# Patient Record
Sex: MALE | Race: WHITE | NOT HISPANIC OR LATINO | Employment: FULL TIME | URBAN - METROPOLITAN AREA
[De-identification: names, ages, dates, MRNs, and addresses within clinical notes are randomized per-mention and may not be internally consistent; named-entity substitution may affect disease eponyms.]

---

## 2017-08-02 ENCOUNTER — OFFICE VISIT (OUTPATIENT)
Dept: URGENT CARE | Facility: URGENT CARE | Age: 47
End: 2017-08-02
Payer: COMMERCIAL

## 2017-08-02 VITALS
TEMPERATURE: 98.1 F | RESPIRATION RATE: 22 BRPM | SYSTOLIC BLOOD PRESSURE: 120 MMHG | DIASTOLIC BLOOD PRESSURE: 82 MMHG | HEART RATE: 76 BPM | OXYGEN SATURATION: 96 % | WEIGHT: 208 LBS

## 2017-08-02 DIAGNOSIS — S61.209A OPEN WOUND OF FINGER, INITIAL ENCOUNTER: Primary | ICD-10-CM

## 2017-08-02 PROCEDURE — 12001 RPR S/N/AX/GEN/TRNK 2.5CM/<: CPT | Performed by: FAMILY MEDICINE

## 2017-08-02 NOTE — MR AVS SNAPSHOT
After Visit Summary   8/2/2017    Vasile Stallworth    MRN: [id number]           Patient Information     Date Of Birth          1970        Visit Information        Provider Department      8/2/2017 7:30 PM Barrett Rod MD [facility]        Today's Diagnoses     Open wound of finger, initial encounter    -  1      Care Instructions      Extremity Laceration: Sutures, Staples, or Tape  A laceration is a cut through the skin. If it is deep, it may require stitches (sutures) or staples to close so it can heal. Minor cuts may be treated with surgical tape closures.   X-rays may be done if something may have entered the skin through the cut. You may also need a tetanus shot if you are not up to date on this vaccination.  Home care    Follow the health care provider s instructions on how to care for the cut.    Wash your hands with soap and warm water before and after caring for your wound. This is to help prevent infection.    Keep the wound clean and dry. If a bandage was applied and it becomes wet or dirty, replace it. Otherwise, leave it in place for the first 24 hours, then change it once a day or as directed.    If sutures or staples were used, clean the wound daily:    After removing the bandage, wash the area with soap and water. Use a wet cotton swab to loosen and remove any blood or crust that forms.    After cleaning, keep the wound clean and dry. Talk with your doctor before applying any antibiotic ointment to the wound. Reapply the bandage.    You may remove the bandage to shower as usual after the first 24 hours, but do not soak the area in water (no swimming) until the stitches or staples are removed.    If surgical tape closures were used, keep the area clean and dry. If it becomes wet, blot it dry with a towel.    The doctor may prescribe an antibiotic cream or ointment to prevent infection. Do not stop taking this medication until you have finished the prescribed course or  the doctor tells you to stop. The doctor may also prescribe medications for pain. Follow the doctor s instructions for taking these medications.    Avoid activities that may reopen your wound.  Follow-up care  Follow up with your health care provider. Most skin wounds heal within ten days. However, an infection may sometimes occur despite proper treatment. Therefore, check the wound daily for the signs of infection listed below. Stitches and staples should be removed within 7-14 days. If surgical tape closures were used, you may remove them after 10 days if they have not fallen off by then.   When to seek medical advice  Call your health care provider right away if any of these occur:    Wound bleeding not controlled by direct pressure    Signs of infection, including increasing pain in the wound, increasing wound redness or swelling, or pus or bad odor coming from the wound    Fever of 100.4 F (38 C) or higher or as directed by your healthcare provider    Stitches or staples come apart or fall out or surgical tape falls off before 7 days    Wound edges re-open    Wound changes colors    Numbness around the wound     Decreased movement around the injured area  Date Last Reviewed: 6/14/2015 2000-2017 [employer]. [address] [place] [address]. All rights reserved. This information is not intended as a substitute for professional medical care. Always follow your healthcare professional's instructions.                Follow-ups after your visit        Who to contact     If you have questions or need follow up information about today's clinic visit or your schedule please contact [facility] directly at [phone].  Normal or non-critical lab and imaging results will be communicated to you by MyChart, letter or phone within 4 business days after the clinic has received the results. If you do not hear from us within 7 days, please contact the clinic through MyChart or phone.  "If you have a critical or abnormal lab result, we will notify you by phone as soon as possible.  Submit refill requests through [employer] or call your pharmacy and they will forward the refill request to us. Please allow 3 business days for your refill to be completed.          Additional Information About Your Visit        [employer] Information     [employer] lets you send messages to your doctor, view your test results, renew your prescriptions, schedule appointments and more. To sign up, go to [email or web address] . Click on \"Log in\" on the left side of the screen, which will take you to the Welcome page. Then click on \"Sign up Now\" on the right side of the page.     You will be asked to enter the access code listed below, as well as some personal information. Please follow the directions to create your username and password.     Your access code is: [email or web address]  Expires: 10/31/2017  8:23 PM     Your access code will  in 90 days. If you need help or a new code, please call your [facility] clinic or [phone].        Care EveryWhere ID     This is your Care EveryWhere ID. This could be used by other organizations to access your [facility] medical records  [id number]        Your Vitals Were     Pulse Temperature Respirations Pulse Oximetry          76 98.1  F (36.7  C) (Oral) 22 96%         Blood Pressure from Last 3 Encounters:   17 120/82    Weight from Last 3 Encounters:   17 208 lb (94.3 kg)              We Performed the Following     Less than 2.5 cm in length        Primary Care Provider    None Specified       No primary provider on file.        Equal Access to Services     [facility]: Hadii thu araujo Sodeirdre, waaxda luqadaha, qaybta kaalmachemo link . So [facility] [phone].    ATENCIÓN: Si habla español, tiene a lewis disposición servicios gratuitos de asistencia lingüística. Llame al [phone].    We comply with applicable federal civil " rights laws and [place] laws. We do not discriminate on the basis of race, color, national origin, age, disability sex, sexual orientation or gender identity.            Thank you!     Thank you for choosing [facility]  for your care. Our goal is always to provide you with excellent care. Hearing back from our patients is one way we can continue to improve our services. Please take a few minutes to complete the written survey that you may receive in the mail after your visit with us. Thank you!             Your Updated Medication List - Protect others around you: Learn how to safely use, store and throw away your medicines at www.disposemymeds.org.      Notice  As of 8/2/2017  8:23 PM    You have not been prescribed any medications.

## 2017-08-03 NOTE — PATIENT INSTRUCTIONS
Extremity Laceration: Sutures, Staples, or Tape  A laceration is a cut through the skin. If it is deep, it may require stitches (sutures) or staples to close so it can heal. Minor cuts may be treated with surgical tape closures.   X-rays may be done if something may have entered the skin through the cut. You may also need a tetanus shot if you are not up to date on this vaccination.  Home care    Follow the health care provider s instructions on how to care for the cut.    Wash your hands with soap and warm water before and after caring for your wound. This is to help prevent infection.    Keep the wound clean and dry. If a bandage was applied and it becomes wet or dirty, replace it. Otherwise, leave it in place for the first 24 hours, then change it once a day or as directed.    If sutures or staples were used, clean the wound daily:    After removing the bandage, wash the area with soap and water. Use a wet cotton swab to loosen and remove any blood or crust that forms.    After cleaning, keep the wound clean and dry. Talk with your doctor before applying any antibiotic ointment to the wound. Reapply the bandage.    You may remove the bandage to shower as usual after the first 24 hours, but do not soak the area in water (no swimming) until the stitches or staples are removed.    If surgical tape closures were used, keep the area clean and dry. If it becomes wet, blot it dry with a towel.    The doctor may prescribe an antibiotic cream or ointment to prevent infection. Do not stop taking this medication until you have finished the prescribed course or the doctor tells you to stop. The doctor may also prescribe medications for pain. Follow the doctor s instructions for taking these medications.    Avoid activities that may reopen your wound.  Follow-up care  Follow up with your health care provider. Most skin wounds heal within ten days. However, an infection may sometimes occur despite proper treatment.  Therefore, check the wound daily for the signs of infection listed below. Stitches and staples should be removed within 7-14 days. If surgical tape closures were used, you may remove them after 10 days if they have not fallen off by then.   When to seek medical advice  Call your health care provider right away if any of these occur:    Wound bleeding not controlled by direct pressure    Signs of infection, including increasing pain in the wound, increasing wound redness or swelling, or pus or bad odor coming from the wound    Fever of 100.4 F (38 C) or higher or as directed by your healthcare provider    Stitches or staples come apart or fall out or surgical tape falls off before 7 days    Wound edges re-open    Wound changes colors    Numbness around the wound     Decreased movement around the injured area  Date Last Reviewed: 6/14/2015 2000-2017 [employer]. [address] [place] [address]. All rights reserved. This information is not intended as a substitute for professional medical care. Always follow your healthcare professional's instructions.

## 2017-08-03 NOTE — NURSING NOTE
Chief Complaint   Patient presents with     Urgent Care     Laceration     ring finger left hand cut with  knife. Has been bleeding for an hour.        Initial /82  Pulse 76  Temp 98.1  F (36.7  C) (Oral)  Resp 22  Wt 208 lb (94.3 kg)  SpO2 96% There is no height or weight on file to calculate BMI.  Medication Reconciliation: complete  Raman Sidhu, CMA

## 2017-08-03 NOTE — PROGRESS NOTES
SUBJECTIVE:     Chief Complaint   Patient presents with     Urgent Care     Laceration     ring finger left hand cut with  knife. Has been bleeding for an hour.      Vasile Stallworth is a 47 year old male who presents to the clinic with a laceration on the left 4th finger sustained 1 hours(s) ago.  This is a non-work related and accidental injury.    Mechanism of injury: knife.    Associated symptoms: Denies numbness, weakness, or loss of function  Last tetanus booster within 10 years: yes    EXAM:   The patient appears today in alert,no apparent distress distress  VITALS: /82  Pulse 76  Temp 98.1  F (36.7  C) (Oral)  Resp 22  Wt 208 lb (94.3 kg)  SpO2 96%    Size of laceration: 1.5 centimeters  Characteristics of the laceration: bleeding- mild, semi-circular and straight  Tendon function intact: not applicable  Sensation to light touch intact: yes  Pulses intact: yes  Picture included in patient's chart: no    Assessment:  Open wound of finger, initial encounter    PLAN:  PROCEDURE NOTE::  Wound was locally injected with 4 cc's of Lidocaine 1% plain  Good anesthesia was obtained  Wound cleaned with betadine/saline solution  Wound cleaned with Shur-Clens  Laceration was closed using 4 5-0 nylon interrupted sutures    After care instructions:  Keep wound clean and dry for the next 24-48 hours  Sutures out in 7 days  Signs of infection discussed today  Apply anti-bacterial ointment for 5 days  Discussed the probability of scarring  Finger splint given to wear    Barrett Rod MD  August 2, 2017 8:27 PM

## 2020-02-23 ENCOUNTER — HEALTH MAINTENANCE LETTER (OUTPATIENT)
Age: 50
End: 2020-02-23

## 2021-04-11 ENCOUNTER — HEALTH MAINTENANCE LETTER (OUTPATIENT)
Age: 51
End: 2021-04-11

## 2021-07-06 ENCOUNTER — OFFICE VISIT (OUTPATIENT)
Dept: PEDIATRICS | Facility: CLINIC | Age: 51
End: 2021-07-06
Payer: COMMERCIAL

## 2021-07-06 VITALS
WEIGHT: 224.7 LBS | OXYGEN SATURATION: 98 % | HEIGHT: 74 IN | DIASTOLIC BLOOD PRESSURE: 78 MMHG | SYSTOLIC BLOOD PRESSURE: 126 MMHG | HEART RATE: 69 BPM | BODY MASS INDEX: 28.84 KG/M2 | TEMPERATURE: 98.2 F

## 2021-07-06 DIAGNOSIS — Z00.00 ROUTINE GENERAL MEDICAL EXAMINATION AT A HEALTH CARE FACILITY: Primary | ICD-10-CM

## 2021-07-06 DIAGNOSIS — Z23 NEED FOR VACCINATION: ICD-10-CM

## 2021-07-06 DIAGNOSIS — Z12.11 SPECIAL SCREENING FOR MALIGNANT NEOPLASMS, COLON: ICD-10-CM

## 2021-07-06 PROCEDURE — 80061 LIPID PANEL: CPT | Performed by: INTERNAL MEDICINE

## 2021-07-06 PROCEDURE — 90471 IMMUNIZATION ADMIN: CPT | Performed by: INTERNAL MEDICINE

## 2021-07-06 PROCEDURE — 36415 COLL VENOUS BLD VENIPUNCTURE: CPT | Performed by: INTERNAL MEDICINE

## 2021-07-06 PROCEDURE — 80053 COMPREHEN METABOLIC PANEL: CPT | Performed by: INTERNAL MEDICINE

## 2021-07-06 PROCEDURE — 99386 PREV VISIT NEW AGE 40-64: CPT | Performed by: INTERNAL MEDICINE

## 2021-07-06 PROCEDURE — 90750 HZV VACC RECOMBINANT IM: CPT | Performed by: INTERNAL MEDICINE

## 2021-07-06 ASSESSMENT — ENCOUNTER SYMPTOMS
ALLERGIC/IMMUNOLOGIC NEGATIVE: 1
NEUROLOGICAL NEGATIVE: 1
ENDOCRINE NEGATIVE: 1
CARDIOVASCULAR NEGATIVE: 1
MUSCULOSKELETAL NEGATIVE: 1
PSYCHIATRIC NEGATIVE: 1
EYES NEGATIVE: 1
RESPIRATORY NEGATIVE: 1
HEMATOLOGIC/LYMPHATIC NEGATIVE: 1
CONSTITUTIONAL NEGATIVE: 1
GASTROINTESTINAL NEGATIVE: 1

## 2021-07-06 ASSESSMENT — MIFFLIN-ST. JEOR: SCORE: 1943.98

## 2021-07-06 NOTE — PATIENT INSTRUCTIONS
"Lab work today:  We can do labs in the exam room today, or you can get them done downstairs in the lab.      If you are going downstairs:  Directions:  As you walk through the first floor, you'll see (on the right) first the pharmacy, then some bathrooms, then the \"Lab and Imaging\" area. Give them your name at the window there and wait for them to call you.     Get your shingles vaccine (\"Shingrix\") today.    You will need a second Shingrix anywhere from 2-6 months later.    Cardio exercise is probably the most helpful thing for your heart and future health.  Try to get about 30 minutes of sustained cardio exercise (biking, running, swimming, fast walking) every other day or even every day.  Keeping your heart rate at a \"target\" of (220 - your age) x 0.80 will be your goal.  For example, if you're 60 years old, this would be (220 - 60) x 0.80 = 128 beats per minute for those 30 minutes of exercise.    Colonoscopy: you can call [phone] to set up your colonoscopy.  If they have not heard from you, they may call you directly.          Preventive Health Recommendations  Male Ages 50 - 64    Yearly exam:             See your health care provider every year in order to  o   Review health changes.   o   Discuss preventive care.    o   Review your medicines if your doctor has prescribed any.     Have a cholesterol test every 5 years, or more frequently if you are at risk for high cholesterol/heart disease.     Have a diabetes test (fasting glucose) every three years. If you are at risk for diabetes, you should have this test more often.     Have a colonoscopy at age 50, or have a yearly FIT test (stool test). These exams will check for colon cancer.      Talk with your health care provider about whether or not a prostate cancer screening test (PSA) is right for you.    You should be tested each year for STDs (sexually transmitted diseases), if you re at risk.     Shots: Get a flu shot each year. Get a tetanus shot " every 10 years.     Nutrition:    Eat at least 5 servings of fruits and vegetables daily.     Eat whole-grain bread, whole-wheat pasta and brown rice instead of white grains and rice.     Get adequate Calcium and Vitamin D.     Lifestyle    Exercise for at least 150 minutes a week (30 minutes a day, 5 days a week). This will help you control your weight and prevent disease.     Limit alcohol to one drink per day.     No smoking.     Wear sunscreen to prevent skin cancer.     See your dentist every six months for an exam and cleaning.     See your eye doctor every 1 to 2 years.

## 2021-07-06 NOTE — PROGRESS NOTES
New Patient/Transfer of Care    Previous PCP or place of care: Dr. Marcia Wilder   Up to date on yearly wellness exam? NO  Reason for transfer of care (if known): Insurance Change     Health Maintenance:    Colonoscopy  Done? NO  Lipid  Done? N    Tdap  Done in last 10 years? Y, on this date: 4/21/2015  Flu   Done this year? N      SUBJECTIVE:   CC: Vasile Stallworth is an 51 year old male who presents for preventative health visit.     Patient has been advised of split billing requirements and indicates understanding: Yes     Healthy Habits:     Getting at least 3 servings of Calcium per day:  Yes    Bi-annual eye exam:  NO    Dental care twice a year:  NO    Sleep apnea or symptoms of sleep apnea:  None    Diet:  Regular (no restrictions)    Frequency of exercise:  2-3 days/week    Duration of exercise:  30-45 minutes    Taking medications regularly:  0    Barriers to taking medications:  None    Medication side effects:  None    PHQ-2 Total Score: 0    Additional concerns today:  No  History of Present Illness       He eats 4 or more servings of fruits and vegetables daily.He consumes 0 sweetened beverage(s) daily.He exercises with enough effort to increase his heart rate 30 to 60 minutes per day.  He exercises with enough effort to increase his heart rate 3 or less days per week.   He is taking medications regularly.  He is not taking prescribed medications regularly due to None.    Less exercise than he'd like; twice per week.      Works in IT: sedentary        Today's PHQ-2 Score:   PHQ-2 ( 1999 [employer]) 7/6/2021   Q1: Little interest or pleasure in doing things 0   Q2: Feeling down, depressed or hopeless 0   PHQ-2 Score 0   Q1: Little interest or pleasure in doing things Not at all   Q2: Feeling down, depressed or hopeless Not at all   PHQ-2 Score 0     Abuse: Current or Past(Physical, Sexual or Emotional)- No  Do you feel safe in your environment? Yes    Have you ever done Advance Care Planning? (For  example, a Health Directive, POLST, or a discussion with a medical provider or your loved ones about your wishes): No, advance care planning information given to patient to review.  Patient plans to discuss their wishes with loved ones or provider.      Social History     Tobacco Use     Smoking status: Never Smoker     Smokeless tobacco: Never Used   Substance Use Topics     Alcohol use: Yes     If you drink alcohol do you typically have >3 drinks per day or >7 drinks per week? No    Last PSA: No results found for: PSA    Reviewed orders with patient. Reviewed health maintenance and updated orders accordingly - Yes  Lab work is in process  Labs reviewed in EPIC  BP Readings from Last 3 Encounters:   07/06/21 126/78   08/02/17 120/82    Wt Readings from Last 3 Encounters:   07/06/21 101.9 kg (224 lb 11.2 oz)   08/02/17 94.3 kg (208 lb)                  Patient Active Problem List   Diagnosis   (none) - all problems resolved or deleted     Past Surgical History:   Procedure Laterality Date     ROTATOR CUFF REPAIR RT/LT Right     Nov 2017     XR ANKLE SURGERY SHELBY RIGHT -       2007       Social History     Tobacco Use     Smoking status: Never Smoker     Smokeless tobacco: Never Used   Substance Use Topics     Alcohol use: Yes     Family History   Problem Relation Age of Onset     Parkinsonism Father          No current outpatient medications on file.     No Known Allergies    Reviewed and updated as needed this visit by clinical staff                 Reviewed and updated as needed this visit by Provider                  History reviewed. No pertinent past medical history.   Past Surgical History:   Procedure Laterality Date     ROTATOR CUFF REPAIR RT/LT Right     Nov 2017     XR ANKLE SURGERY SHELBY RIGHT - PH      2007       Review of Systems   Constitutional: Negative.    HENT: Negative.    Eyes: Negative.    Respiratory: Negative.    Cardiovascular: Negative.    Gastrointestinal: Negative.    Endocrine: Negative.  "   Genitourinary: Negative.    Musculoskeletal: Negative.    Allergic/Immunologic: Negative.    Neurological: Negative.    Hematological: Negative.    Psychiatric/Behavioral: Negative.      CONSTITUTIONAL: NEGATIVE for fever, chills, change in weight  INTEGUMENTARY/SKIN: NEGATIVE for worrisome rashes, moles or lesions  EYES: NEGATIVE for vision changes or irritation  ENT: NEGATIVE for ear, mouth and throat problems  RESP: NEGATIVE for significant cough or SOB  CV: NEGATIVE for chest pain, palpitations or peripheral edema  GI: NEGATIVE for nausea, abdominal pain, heartburn, or change in bowel habits   male: negative for dysuria, hematuria, decreased urinary stream, erectile dysfunction, urethral discharge  MUSCULOSKELETAL: NEGATIVE for significant arthralgias or myalgia  NEURO: NEGATIVE for weakness, dizziness or paresthesias  PSYCHIATRIC: NEGATIVE for changes in mood or affect    OBJECTIVE:   /78   Pulse 69   Temp 98.2  F (36.8  C) (Tympanic)   Ht 1.88 m (6' 2\")   Wt 101.9 kg (224 lb 11.2 oz)   SpO2 98%   BMI 28.85 kg/m      Physical Exam  GENERAL: healthy, alert and no distress  EYES: Eyes grossly normal to inspection, PERRL and conjunctivae and sclerae normal  HENT: ear canals and TM's normal, nose and mouth without ulcers or lesions  NECK: no adenopathy, no asymmetry, masses, or scars and thyroid normal to palpation  RESP: lungs clear to auscultation - no rales, rhonchi or wheezes  CV: regular rate and rhythm, normal S1 S2, no S3 or S4, no murmur, click or rub, no peripheral edema and peripheral pulses strong  ABDOMEN: soft, nontender, no hepatosplenomegaly, no masses and bowel sounds normal   (male): normal male genitalia without lesions or urethral discharge, no hernia  MS: no gross musculoskeletal defects noted, no edema  SKIN: no suspicious lesions or rashes  NEURO: Normal strength and tone, mentation intact and speech normal  PSYCH: mentation appears normal, affect normal/bright    Diagnostic " Test Results:  Labs reviewed in Epic    ASSESSMENT/PLAN:   1. Routine general medical examination at a health care facility  Discussed diet, exercise, testicular self exam, blood pressure, cholesterol, and need for cancer surveillance at appropriate ages.   - Lipid panel reflex to direct LDL Fasting  - Comprehensive metabolic panel (BMP + Alb, Alk Phos, ALT, AST, Total. Bili, TP)    2. Special screening for malignant neoplasms, colon  Due.  Ordered.   - GASTROENTEROLOGY ADULT REF PROCEDURE ONLY; Future    Patient has been advised of split billing requirements and indicates understanding: Yes  COUNSELING:   Reviewed preventive health counseling, as reflected in patient instructions       Regular exercise       Healthy diet/nutrition       Vision screening       Hearing screening       Colon cancer screening       Prostate cancer screening    There is no height or weight on file to calculate BMI.         He reports that he has never smoked. He has never used smokeless tobacco.      Counseling Resources:  ATP IV Guidelines  Pooled Cohorts Equation Calculator  FRAX Risk Assessment  ICSI Preventive Guidelines  Dietary Guidelines for Americans, 2010  USDA's MyPlate  ASA Prophylaxis  Lung CA Screening    Mukesh Palmer MD  [facility]

## 2021-07-07 LAB
ALBUMIN SERPL-MCNC: 4.1 G/DL (ref 3.4–5)
ALP SERPL-CCNC: 55 U/L (ref 40–150)
ALT SERPL W P-5'-P-CCNC: 28 U/L (ref 0–70)
ANION GAP SERPL CALCULATED.3IONS-SCNC: 8 MMOL/L (ref 3–14)
AST SERPL W P-5'-P-CCNC: 21 U/L (ref 0–45)
BILIRUB SERPL-MCNC: 0.5 MG/DL (ref 0.2–1.3)
BUN SERPL-MCNC: 15 MG/DL (ref 7–30)
CALCIUM SERPL-MCNC: 9.2 MG/DL (ref 8.5–10.1)
CHLORIDE SERPL-SCNC: 104 MMOL/L (ref 94–109)
CHOLEST SERPL-MCNC: 185 MG/DL
CO2 SERPL-SCNC: 25 MMOL/L (ref 20–32)
CREAT SERPL-MCNC: 1.13 MG/DL (ref 0.66–1.25)
GFR SERPL CREATININE-BSD FRML MDRD: 75 ML/MIN/{1.73_M2}
GLUCOSE SERPL-MCNC: 87 MG/DL (ref 70–99)
HDLC SERPL-MCNC: 53 MG/DL
LDLC SERPL CALC-MCNC: 105 MG/DL
NONHDLC SERPL-MCNC: 132 MG/DL
POTASSIUM SERPL-SCNC: 4.2 MMOL/L (ref 3.4–5.3)
PROT SERPL-MCNC: 6.9 G/DL (ref 6.8–8.8)
SODIUM SERPL-SCNC: 137 MMOL/L (ref 133–144)
TRIGL SERPL-MCNC: 134 MG/DL

## 2021-09-25 ENCOUNTER — HEALTH MAINTENANCE LETTER (OUTPATIENT)
Age: 51
End: 2021-09-25

## 2022-07-15 RX ORDER — MULTIVITAMIN,THERAPEUTIC
1 TABLET ORAL DAILY
COMMUNITY

## 2022-07-18 ENCOUNTER — HOSPITAL ENCOUNTER (OUTPATIENT)
Facility: CLINIC | Age: 52
Discharge: HOME | End: 2022-07-18
Attending: INTERNAL MEDICINE | Admitting: INTERNAL MEDICINE
Payer: COMMERCIAL

## 2022-07-18 VITALS
HEIGHT: 74 IN | HEART RATE: 66 BPM | DIASTOLIC BLOOD PRESSURE: 81 MMHG | OXYGEN SATURATION: 100 % | SYSTOLIC BLOOD PRESSURE: 116 MMHG | TEMPERATURE: 97.7 F | BODY MASS INDEX: 28.23 KG/M2 | RESPIRATION RATE: 14 BRPM | WEIGHT: 220 LBS

## 2022-07-18 LAB — COLONOSCOPY: NORMAL

## 2022-07-18 PROCEDURE — G0500 MOD SEDAT ENDO SERVICE >5YRS: HCPCS | Performed by: INTERNAL MEDICINE

## 2022-07-18 PROCEDURE — 250N000011 HC RX IP 250 OP 636: Performed by: INTERNAL MEDICINE

## 2022-07-18 PROCEDURE — 45378 DIAGNOSTIC COLONOSCOPY: CPT | Performed by: INTERNAL MEDICINE

## 2022-07-18 PROCEDURE — G0121 COLON CA SCRN NOT HI RSK IND: HCPCS | Performed by: INTERNAL MEDICINE

## 2022-07-18 RX ORDER — EPINEPHRINE 1 MG/ML
0.1 INJECTION, SOLUTION INTRAMUSCULAR; SUBCUTANEOUS
Status: DISCONTINUED | OUTPATIENT
Start: 2022-07-18 | End: 2022-07-18 | Stop reason: HOSPADM

## 2022-07-18 RX ORDER — NALOXONE HYDROCHLORIDE 0.4 MG/ML
0.4 INJECTION, SOLUTION INTRAMUSCULAR; INTRAVENOUS; SUBCUTANEOUS
Status: DISCONTINUED | OUTPATIENT
Start: 2022-07-18 | End: 2022-07-18 | Stop reason: HOSPADM

## 2022-07-18 RX ORDER — PROCHLORPERAZINE MALEATE 10 MG
10 TABLET ORAL EVERY 6 HOURS PRN
Status: DISCONTINUED | OUTPATIENT
Start: 2022-07-18 | End: 2022-07-18 | Stop reason: HOSPADM

## 2022-07-18 RX ORDER — ATROPINE SULFATE 0.1 MG/ML
1 INJECTION INTRAVENOUS
Status: DISCONTINUED | OUTPATIENT
Start: 2022-07-18 | End: 2022-07-18 | Stop reason: HOSPADM

## 2022-07-18 RX ORDER — FENTANYL CITRATE 0.05 MG/ML
50-100 INJECTION, SOLUTION INTRAMUSCULAR; INTRAVENOUS EVERY 5 MIN PRN
Status: DISCONTINUED | OUTPATIENT
Start: 2022-07-18 | End: 2022-07-18 | Stop reason: HOSPADM

## 2022-07-18 RX ORDER — ONDANSETRON 2 MG/ML
4 INJECTION INTRAMUSCULAR; INTRAVENOUS EVERY 6 HOURS PRN
Status: DISCONTINUED | OUTPATIENT
Start: 2022-07-18 | End: 2022-07-18 | Stop reason: HOSPADM

## 2022-07-18 RX ORDER — FLUMAZENIL 0.1 MG/ML
0.2 INJECTION, SOLUTION INTRAVENOUS
Status: DISCONTINUED | OUTPATIENT
Start: 2022-07-18 | End: 2022-07-18 | Stop reason: HOSPADM

## 2022-07-18 RX ORDER — NALOXONE HYDROCHLORIDE 0.4 MG/ML
0.2 INJECTION, SOLUTION INTRAMUSCULAR; INTRAVENOUS; SUBCUTANEOUS
Status: DISCONTINUED | OUTPATIENT
Start: 2022-07-18 | End: 2022-07-18 | Stop reason: HOSPADM

## 2022-07-18 RX ORDER — ONDANSETRON 2 MG/ML
4 INJECTION INTRAMUSCULAR; INTRAVENOUS
Status: DISCONTINUED | OUTPATIENT
Start: 2022-07-18 | End: 2022-07-18 | Stop reason: HOSPADM

## 2022-07-18 RX ORDER — SIMETHICONE 40MG/0.6ML
133 SUSPENSION, DROPS(FINAL DOSAGE FORM)(ML) ORAL
Status: DISCONTINUED | OUTPATIENT
Start: 2022-07-18 | End: 2022-07-18 | Stop reason: HOSPADM

## 2022-07-18 RX ORDER — DIPHENHYDRAMINE HYDROCHLORIDE 50 MG/ML
25-50 INJECTION INTRAMUSCULAR; INTRAVENOUS
Status: DISCONTINUED | OUTPATIENT
Start: 2022-07-18 | End: 2022-07-18 | Stop reason: HOSPADM

## 2022-07-18 RX ORDER — ONDANSETRON 4 MG/1
4 TABLET, ORALLY DISINTEGRATING ORAL EVERY 6 HOURS PRN
Status: DISCONTINUED | OUTPATIENT
Start: 2022-07-18 | End: 2022-07-18 | Stop reason: HOSPADM

## 2022-07-18 RX ORDER — LIDOCAINE 40 MG/G
CREAM TOPICAL
Status: DISCONTINUED | OUTPATIENT
Start: 2022-07-18 | End: 2022-07-18 | Stop reason: HOSPADM

## 2022-07-18 RX ADMIN — MIDAZOLAM 2 MG: 1 INJECTION INTRAMUSCULAR; INTRAVENOUS at 13:44

## 2022-07-18 RX ADMIN — FENTANYL CITRATE 100 MCG: 50 INJECTION INTRAMUSCULAR; INTRAVENOUS at 13:44

## 2022-07-18 NOTE — H&P
"Pre-Endoscopy History and Physical     Vasile Stallworth MRN# [id number]   YOB: 1970 Age: 52 year old     Date of Procedure: 7/18/2022  Primary care provider: Mukesh Palmer  Type of Endoscopy: Colonoscopy with possible biopsy, possible polypectomy  Reason for Procedure: screen  Type of Anesthesia Anticipated: Conscious Sedation    HPI:    Vasile is a 52 year old male who will be undergoing the above procedure.      A history and physical has been performed. The patient's medications and allergies have been reviewed. The risks and benefits of the procedure and the sedation options and risks were discussed with the patient.  All questions were answered and informed consent was obtained.      He denies a personal or family history of anesthesia complications or bleeding disorders.     Patient Active Problem List   Diagnosis   (none) - all problems resolved or deleted        No past medical history on file.     Past Surgical History:   Procedure Laterality Date     ROTATOR CUFF REPAIR RT/LT Right     Nov 2017     XR ANKLE SURGERY SHELBY RIGHT - PH      2007       Social History     Tobacco Use     Smoking status: Never Smoker     Smokeless tobacco: Never Used   Substance Use Topics     Alcohol use: Yes     Comment: 10-14 drinks a week       Family History   Problem Relation Age of Onset     Parkinsonism Father      Colon Cancer No family hx of        Prior to Admission medications    Medication Sig Start Date End Date Taking? Authorizing Provider   multivitamin, therapeutic (THERA-VIT) TABS tablet Take 1 tablet by mouth daily   Yes Reported, Patient       No Known Allergies     REVIEW OF SYSTEMS:   5 point ROS negative except as noted above in HPI, including Gen., Resp., CV, GI &  system review.    PHYSICAL EXAM:   There were no vitals taken for this visit. Estimated body mass index is 28.85 kg/m  as calculated from the following:    Height as of 7/6/21: 1.88 m (6' 2\").    Weight as of 7/6/21: 101.9 kg (224 " lb 11.2 oz).   GENERAL APPEARANCE: alert, and oriented  MENTAL STATUS: alert  AIRWAY EXAM: Mallampatti Class I (visualization of the soft palate, fauces, uvula, anterior and posterior pillars)  RESP: lungs clear to auscultation - no rales, rhonchi or wheezes  CV: regular rates and rhythm  DIAGNOSTICS:    Not indicated    IMPRESSION   ASA Class 2 - Mild systemic disease    PLAN:   Plan for Colonoscopy with possible biopsy, possible polypectomy. We discussed the risks, benefits and alternatives and the patient wished to proceed.    The above has been forwarded to the consulting provider.      Signed Electronically by: Deonder Trinh MD  July 18, 2022

## 2022-07-18 NOTE — LETTER
July 1, 2022      Vasile Stallworth  [address]  [place] [address]        Dear Vasile,     Please be aware that coverage of these services is subject to the terms and limitations of your health insurance plan.  Call member services at your health plan with any benefit or coverage questions.    Thank you for choosing [facility]. You are scheduled for the following service(s):    Date:  7/18/2022 Monday             Procedure:  COLONOSCOPY  Doctor:        Dr. Trinh   Arrival Time:  12:45 PM *Enter and check in at the Main Hospital Entrance  Procedure Time:  1:30 PM      Location:   [facility]        Endoscopy Department, First Floor         [address] [place], [place] [address]      [phone] or [phone] ([facility]) to reschedule        MIRALAX -GATORADE  PREP  Colonoscopy is the most accurate test to detect colon polyps and colon cancer; and the only test where polyps can be removed. During this procedure, a doctor examines the lining of your large intestine and rectum through a flexible tube.   Transportation  You must arrange for a ride for the day of your procedure with a responsible adult. A taxi , Uber, etc, is not an option unless you are accompanied by a responsible adult. If you fail to arrange transportation with a responsible adult, your procedure will be cancelled and rescheduled.    Purchase the  following supplies at your local pharmacy:  - 2 (two) bisacodyl tablets: each tablet contains 5 mg.  (Dulcolax  laxative NOT Dulcolax  stool softener)   - 1 (one) 8.3 oz bottle of Polyethylene Glycol (PEG) 3350 Powder   (MiraLAX , Smooth LAX , ClearLAX  or equivalent)  - 64 oz Gatorade    Regular Gatorade, Gatorade G2 , Powerade , Powerade Zero  or Pedialyte  is acceptable. Red colored flavors are not allowed; all other colors (yellow, green, orange, purple and blue) are okay. It is also okay to buy two 2.12 oz packets of powdered Gatorade that  can be mixed with water to a total volume of 64 oz of liquid.  - 1 (one) 10 oz bottle of Magnesium Citrate (Red colored flavors are not allowed)  It is also okay for you to use a 0.5 oz package of powdered magnesium citrate (17 g) mixed with 10 oz of water.      PREPARATION FOR COLONOSCOPY    7 days before:    Discontinue fiber supplements and medications containing iron. This includes Metamucil  and Fibercon ; and multivitamins with iron.    3 days before:    Begin a low-fiber diet. A low-fiber diet helps making the cleanout more effective.     Examples of a low-fiber diet include (but are not limited to): white bread, white rice, pasta, crackers, fish, chicken, eggs, ground beef, creamy peanut butter, cooked/steamed/boiled vegetables, canned fruit, bananas, melons, milk, plain yogurt cheese, salad dressing and other condiments.     The following are not allowed on a low-fiber diet: seeds, nuts, popcorn, bran, whole wheat, corn, quinoa, raw fruits and vegetables, berries and dried fruit, beans and lentils.    For additional details on low-fiber diet, please refer to the table on the last page.    2 days before:    Continue the low-fiber diet.     Drink at least 8 glasses of water throughout the day.     Stop eating solid foods at 11:45 pm.    1 day before:    In the morning: begin a clear liquid diet (liquids you can see through).     Examples of a clear liquid diet include: water, clear broth or bouillon, Gatorade, Pedialyte or Powerade, carbonated and non-carbonated soft drinks (Sprite , 7-Up , ginger ale), strained fruit juices without pulp (apple, white grape, white cranberry), Jell-O  and popsicles.     The following are not allowed on a clear liquid diet: red liquids, alcoholic beverages, dairy products (milk, creamer, and yogurt), protein shakes, creamy broths, juice with pulp and chewing tobacco.    At noon: take 2 (two) bisacodyl tablets     At 4 (and no later than 6pm): start drinking the Miralax-Gatorade  preparation (8.3 oz of Miralax mixed with 64 oz of Gatorade in a large pitcher). Drink 1(one) 8 oz glass every 15 minutes thereafter, until the mixture is gone.    COLON CLEANSING TIPS: drink adequate amounts of fluids before and after your colon cleansing to prevent dehydration. Stay near a toilet because you will have diarrhea. Even if you are sitting on the toilet, continue to drink the cleansing solution every 15 minutes. If you feel nauseous or vomit, rinse your mouth with water, take a 15 to 30-minute-break and then continue drinking the solution. You will be uncomfortable until the stool has flushed from your colon (in about 2 to 4 hours). You may feel chilled.    Day of your procedure  You may take your morning medications including blood pressure medications, methadone, anti-seizure medications with sips of water 3 hours prior to your procedure or earlier. Do not take insulin, blood thinners (unless specifically told by your primary care provider) or vitamins prior to your procedure. Continue the clear liquid diet.   4 hours prior: drink 10 oz of magnesium citrate. It may be easier to drink it with a straw.    STOP consuming all liquids after that.     Do not take anything by mouth during this time.     Allow extra time to travel to your procedure as you may need to stop and use a restroom along the way.    You are ready for the procedure, if you followed all instructions and your stool is no longer formed, but clear or yellow liquid. If you are unsure whether your colon is clean, please call our office at [phone] before you leave for your appointment.    Bring the following to your procedure:  - Insurance Card/Photo ID.   - List of current medications including over-the-counter medications and supplements.   - Your rescue inhaler if you currently use one to control asthma.    Canceling or rescheduling your appointment:   If you must cancel or reschedule your appointment, please call [phone] as  soon as possible.      COLONOSCOPY PRE-PROCEDURE CHECKLIST    If you have diabetes, ask your regular doctor for diet and medication restrictions.  If you take an anticoagulant or anti-platelet medication (such as Coumadin , Lovenox , Pradaxa , Xarelto , Eliquis , etc.), please call your primary doctor for advice on holding this medication.  If you take aspirin you may continue to do so.  If you are or may be pregnant, please discuss the risks and benefits of this procedure with your doctor.        What happens during a colonoscopy?    Plan to spend up to two hours, starting at registration time, at the endoscopy center the day of your procedure. The colonoscopy takes an average of 15 to 30 minutes. Recovery time is about 30 minutes.      Before the exam:    You will change into a gown.    Your medical history and medication list will be reviewed with you, unless that has been done over the phone prior to the procedure.     A nurse will insert an intravenous (IV) line into your hand or arm.    The doctor will meet with you and will give you a consent form to sign.  During the exam:     Medicine will be given through the IV line to help you relax.     Your heart rate and oxygen levels will be monitored. If your blood pressure is low, you may be given fluids through the IV line.     The doctor will insert a flexible hollow tube, called a colonoscope, into your rectum. The scope will be advanced slowly through the large intestine (colon).    You may have a feeling of fullness or pressure.     If an abnormal tissue or a polyp is found, the doctor may remove it through the endoscope for closer examination, or biopsy. Tissue removal is painless    After the exam:           Any tissue samples removed during the exam will be sent to a lab for evaluation. It may take 5-7 working days for you to be notified of the results.     A nurse will provide you with complete discharge instructions before you leave the endoscopy center.  Be sure to ask the nurse for specific instructions if you take blood thinners such as Aspirin, Coumadin or Plavix.     The doctor will prepare a full report for you and for the physician who referred you for the procedure.     Your doctor will talk with you about the initial results of your exam.      Medication given during the exam will prohibit you from driving for the rest of the day.     Following the exam, you may resume your normal diet. Your first meal should be light, no greasy foods. Avoid alcohol until the next day.     You may resume your regular activities the day after the procedure.         LOW-FIBER DIET    Foods RECOMMENDED Foods to AVOID   Breads, Cereal, Rice and Pasta:   White bread, rolls, biscuits, croissant and nani toast.   Waffles, Hungarian toast and pancakes.   White rice, noodles, pasta, macaroni and peeled cooked potatoes.   Plain crackers and saltines.   Cooked cereals: farina, cream of rice.   Cold cereals: Puffed Rice , Rice Krispies , Corn Flakes  and Special K    Breads, Cereal, Rice and Pasta:   Breads or rolls with nuts, seeds or fruit.   Whole wheat, pumpernickel, rye breads and cornbread.   Potatoes with skin, brown or wild rice, and kasha (buckwheat).     Vegetables:   Tender cooked and canned vegetables without seeds: carrots, asparagus tips, green or wax beans, pumpkin, spinach, lima beans. Vegetables:   Raw or steamed vegetables.   Vegetables with seeds.   Sauerkraut.   Winter squash, peas, broccoli, Brussel sprouts, cabbage, onions, cauliflower, baked beans, peas and corn.   Fruits:   Strained fruit juice.   Canned fruit, except pineapple.   Ripe bananas and melon. Fruits:   Prunes and prune juice.   Raw fruits.   Dried fruits: figs, dates and raisins.   Milk/Dairy:   Milk: plain or flavored.   Yogurt, custard and ice cream.   Cheese and cottage cheese Milk/Dairy:     Meat and other proteins:   ground, well-cooked tender beef, lamb, ham, veal, pork, fish, poultry and organ  meats.   Eggs.   Peanut butter without nuts. Meat and other proteins:   Tough, fibrous meats with gristle.   Dry beans, peas and lentils.   Peanut butter with nuts.   Tofu.   Fats, Snack, Sweets, Condiments and Beverages:   Margarine, butter, oils, mayonnaise, sour cream and salad dressing, plain gravy.   Sugar, hard candy, clear jelly, honey and syrup.   Spices, cooked herbs, bouillon, broth and soups made with allowed vegetable, ketchup and mustard.   Coffee, tea and carbonated drinks.   Plain cakes, cookies and pretzels.   Gelatin, plain puddings, custard, ice cream, sherbet and popsicles. Fats, Snack, Sweets, Condiments and Beverages:   Nuts, seeds and coconut.   Jam, marmalade and preserves.   Pickles, olives, relish and horseradish.   All desserts containing nuts, seeds, dried fruit and coconut; or made from whole grains or bran.   Candy made with nuts or seeds.   Popcorn.         DIRECTIONS TO THE ENDOSCOPY DEPARTMENT    From the north ([facility])  Take [address] South, exit on [address] [address]. Get into the left hand anthony, turn left (east), go one-half mile to [address] and turn left. Go north to the second stoplight, take a right on [address] and follow it to the Main Hospital entrance.    From the south ([facility])  Take 35N to the [address]E split and exit on [address] [address]. On [address] [address], turn left (west) to [address]. Turn right (north) on [address]. Go north to the second stoplight, take a right on [address] and follow it to the Main Hospital entrance.    From the east via 35E ([facility])  Take [address] south to [address] [address] exit. Turn right on [address] [address]. Go west to [address]. Turn right (north) on [address]. Go to the second stoplight, take a right on [address] to the Main Hospital entrance.    From the east via [address] ([facility])  Take [address] to [address]. Turn left (south) on [address] to [address], turn left (east) on [address] and follow it to the Main Hospital entrance.    From the west via [address] ([place], [place])  Take [address] east to [address]. Turn right (south) on [address] to [address], turn left (east) on [address] and follow it to the Main Hospital entrance.

## 2022-08-02 ENCOUNTER — OFFICE VISIT (OUTPATIENT)
Dept: INTERNAL MEDICINE | Facility: CLINIC | Age: 52
End: 2022-08-02
Payer: COMMERCIAL

## 2022-08-02 VITALS
HEART RATE: 94 BPM | WEIGHT: 216.2 LBS | OXYGEN SATURATION: 98 % | RESPIRATION RATE: 16 BRPM | SYSTOLIC BLOOD PRESSURE: 115 MMHG | BODY MASS INDEX: 27.75 KG/M2 | DIASTOLIC BLOOD PRESSURE: 71 MMHG | TEMPERATURE: 98.4 F | HEIGHT: 74 IN

## 2022-08-02 DIAGNOSIS — M25.511 ACUTE PAIN OF RIGHT SHOULDER: Primary | ICD-10-CM

## 2022-08-02 PROCEDURE — 99203 OFFICE O/P NEW LOW 30 MIN: CPT | Performed by: INTERNAL MEDICINE

## 2022-08-02 NOTE — NURSING NOTE
"/71 (BP Location: Right arm, Patient Position: Sitting, Cuff Size: Adult Large)   Pulse 94   Temp 98.4  F (36.9  C) (Oral)   Resp 16   Ht 1.88 m (6' 2\")   Wt 98.1 kg (216 lb 3.2 oz)   SpO2 98%   BMI 27.76 kg/m      "

## 2022-08-02 NOTE — PROGRESS NOTES
Assessment & Plan     Acute pain of right shoulder  Advised this is most likely some impingement, may have some biceps tendinitis.  Does not seem to be likely to be rotator cuff tear.  Continue using ice, can increase ibuprofen to 3 to 4 tablets 3 times a day for 5 to 7 days.  Given a handout with some range of motion exercises.  If it is not improving or worsening, refer to orthopedics, he would not need to return but can just send a message through [employer].        Return in about 2 months (around 10/2/2022) for Physical Exam with Dr. Palmer.    Julia Bergeron MD  [facility] ANNE Garcia is a 52 year old, presenting for the following health issues:    He presents with complaints of right shoulder pain: This is anterior, a little bit posterior.  It started 1-1/2 weeks ago after lifting some paving blocks and carrying heavy bags weighing about 40 pounds.  It seemed to gradually improve but 5 days ago he had tripped while carrying a box and fell forward with some increased pain again.  Then 3 days ago he did a lot of painting with some increased pain after that.  It is okay at rest, hurts with movements, especially flexion.  He has been using ice, ibuprofen 2 tablets about 3 times a day.  He had a previous rotator cuff tear and this feels very different from that experience.      History of Present Illness       Reason for visit:  Shoulder pain  Symptom onset:  1-2 weeks ago  Symptom intensity:  Severe  Symptom progression:  Staying the same  Had these symptoms before:  No  What makes it worse:  Lifting things  What makes it better:  Ice    He eats 4 or more servings of fruits and vegetables daily.He consumes 0 sweetened beverage(s) daily.He exercises with enough effort to increase his heart rate 30 to 60 minutes per day.  He exercises with enough effort to increase his heart rate 3 or less days per week.   He is taking medications regularly.       Patient Active Problem List   Diagnosis  "  (none) - all problems resolved or deleted     Current Outpatient Medications   Medication Sig Dispense Refill     multivitamin, therapeutic (THERA-VIT) TABS tablet Take 1 tablet by mouth daily            Review of Systems   negative      Objective    /71 (BP Location: Right arm, Patient Position: Sitting, Cuff Size: Adult Large)   Pulse 94   Temp 98.4  F (36.9  C) (Oral)   Resp 16   Ht 1.88 m (6' 2\")   Wt 98.1 kg (216 lb 3.2 oz)   SpO2 98%   BMI 27.76 kg/m    Body mass index is 27.76 kg/m .  Physical Exam     Right shoulder: There is some tenderness anterior shoulder, posterior shoulder.  No significant tenderness at the AC joint.  There is moderate tenderness of the biceps tendons.  No significant tenderness at the supraspinatus tendon insertion.  There is mild pain at full external rotation.  No pain with internal rotation.  Mild pain with extension after 20 degrees.  Pain with flexion about 90 degrees.  Pain with abduction about 75 degrees.  No weakness of the muscles.                  .  ..  "

## 2022-08-15 NOTE — TELEPHONE ENCOUNTER
DIAGNOSIS: Hit the back of my left hand on a bracket a few months ago, has given persistent discomfort at the base of index and middle fingers. Has gotten worse lately as I've been dealing with a right shoulder issue and relied on that hand more, and have realized I've been avoiding using that hand, playing guitar, and doing many other things, so I need to get this looked at.   APPOINTMENT DATE: 8.17.22   NOTES STATUS DETAILS   MEDICATION LIST Internal

## 2022-08-16 DIAGNOSIS — M79.642 LEFT HAND PAIN: Primary | ICD-10-CM

## 2022-08-17 ENCOUNTER — ANCILLARY PROCEDURE (OUTPATIENT)
Dept: GENERAL RADIOLOGY | Facility: CLINIC | Age: 52
End: 2022-08-17
Attending: FAMILY MEDICINE
Payer: COMMERCIAL

## 2022-08-17 ENCOUNTER — OFFICE VISIT (OUTPATIENT)
Dept: ORTHOPEDICS | Facility: CLINIC | Age: 52
End: 2022-08-17
Payer: COMMERCIAL

## 2022-08-17 ENCOUNTER — PRE VISIT (OUTPATIENT)
Dept: ORTHOPEDICS | Facility: CLINIC | Age: 52
End: 2022-08-17

## 2022-08-17 VITALS — HEIGHT: 74 IN | WEIGHT: 216 LBS | BODY MASS INDEX: 27.72 KG/M2

## 2022-08-17 DIAGNOSIS — M25.542 METACARPOPHALANGEAL JOINT PAIN OF LEFT HAND: Primary | ICD-10-CM

## 2022-08-17 DIAGNOSIS — M79.642 LEFT HAND PAIN: ICD-10-CM

## 2022-08-17 PROCEDURE — 99203 OFFICE O/P NEW LOW 30 MIN: CPT | Mod: GC | Performed by: FAMILY MEDICINE

## 2022-08-17 PROCEDURE — 73130 X-RAY EXAM OF HAND: CPT | Mod: LT | Performed by: RADIOLOGY

## 2022-08-17 RX ORDER — DICLOFENAC SODIUM 75 MG/1
75 TABLET, DELAYED RELEASE ORAL 2 TIMES DAILY
Qty: 28 TABLET | Refills: 0 | Status: SHIPPED | OUTPATIENT
Start: 2022-08-17 | End: 2023-05-11

## 2022-08-17 NOTE — LETTER
"  8/17/2022      RE: Vasile Stallworth  [address] [address]     Dear Colleague,    Thank you for referring your patient, Vasile Stallworth, to the [facility] [place]. Please see a copy of my visit note below.    [facility]  Sports Medicine Clinic  Clinics and Surgery Center              SUBJECTIVE       Vasile Stallworth is a 52 year old male presenting to clinic today with a chief complaint of left hand pain.    Started 2 months ago after he swung his arm back into the bracket of the garage door. Immediately painful. 2-3 at its worse. Dull achy. Playing [email or web address] and typing at work instigate the pain. Does not radiate. Iced initially. Has not trialed NSAID or PT. No numbness or tingling.    PMH, Medications and Allergies were reviewed and updated as needed.    ROS:  As noted above otherwise negative.    Patient Active Problem List   Diagnosis   (none) - all problems resolved or deleted       Current Outpatient Medications   Medication Sig Dispense Refill     diclofenac (VOLTAREN) 75 MG EC tablet Take 1 tablet (75 mg) by mouth 2 times daily for 14 days 28 tablet 0     multivitamin, therapeutic (THERA-VIT) TABS tablet Take 1 tablet by mouth daily              OBJECTIVE:       Vitals:   Vitals:    08/17/22 1424   Weight: 98 kg (216 lb)   Height: 1.88 m (6' 2\")     BMI: Body mass index is 27.73 kg/m .    Gen:  Well nourished and in no acute distress  HEENT: Extraocular movement intact  Neck: Supple  Pulm:  Breathing Comfortably. No increased respiratory effort.  Psych: Euthymic. Appropriately answers questions    MSK:   Musculoskeletal: left hand  Inspection: + swelling over 2nd MCP joint pointer finger, no ecchymosis or obvious deformity.  Skin is intact.  Palpation: Tenderness to palpation of interweb space of 2nd and 3rd MCP joints.  Nontender throughout the remainder of hand and fingers. No tenderness to palpation overlying anatomical snuffbox.   Range of motion: Good " overall range of motion of thumb in all planes.  Fingers with normal flexion and extension.  Flexion of all digits at the MCP joint reproduces his pain.  FDP and FDS intact. EDC and EIP intact.   No rotational malalignment or scissoring of digits.  Strength: Full strength of flexion and extension of digits.   strength normal compared bilaterally.   Special Tests:   -Thumb opposition: intact  -Hand intrinsics: Intact  -Medial and lateral stress of MCP joint of second digit: No laxity  Neurologic: Sensation intact throughout hand and fingers  Vascular: Capillary refill <2s.  Digits warm, well perfused.     XRAY of left hand:   Findings:     PA, oblique and lateral view(s) of the left hand were obtained.      No acute osseous abnormality. Subtle cortical irregularity of the  ulnar styloid.     No significant degenerative changes.     Soft tissue is unremarkable.                                                                      Impression:  1. No acute osseous abnormality.  2. No significant degenerative changes.           ASSESSMENT and PLAN:     Vasile was seen today for pain.    Diagnoses and all orders for this visit:    Metacarpophalangeal joint pain of left hand  -     diclofenac (VOLTAREN) 75 MG EC tablet; Take 1 tablet (75 mg) by mouth 2 times daily for 14 days      Jose is a pleasant healthy 52-year-old male who presents with 2 months of of left hand pain after traumatically hitting it against a garage door, found to have enlarged second MCP joint on exam, tenderness to palpation between the second and third metacarpal joints on the dorsal side, with radiographs today in clinic unrevealing of any osseous abnormality.  This is most suggestive of a second MCP joint effusion versus synovitis and or bone bruise.  We will start with 2 weeks of anti-inflammatory, and if after 2 weeks no improvement he should return to clinic for ultrasound of this joint    - Diclofenac 75 mg bid for 2 weeks then rtc for US of  joint if no improvement  - discharged with Co-band today for wrapping and supporting fingers    Return sooner if develops new or worsening symptoms.    Options for treatment and/or follow-up care were reviewed with the patient was actively involved in the decision making process. Patient verbalized understanding and was in agreement with the plan.    The patient was seen by and discussed with attending physician Dr.Suzanne KIRILL Bates MD, CAQ, CCD, who agrees with the plan as stated unless otherwise stated.     Melvin Post DO  Primary Care Sports Medicine Fellow      Attending Note:   I have examined this patient/images and have reviewed the clinical presentation and progress note with the fellow. I agree with the treatment plan as outlined. The plan was formulated with the fellow on the day of the patient's visit.    We discussed the side effects of NSAIDs and asked him to take the diclofenac with meals and to avoid other NSAIDs.  Ok to use Tylenol.     Lizeth Bates MD, CAQ, CCD  [facility]  Sports Medicine and Bone Health

## 2022-08-17 NOTE — PROGRESS NOTES
"[facility]  Sports Medicine Clinic  Clinics and Surgery Center              SUBJECTIVE       Vasile Stallworth is a 52 year old male presenting to clinic today with a chief complaint of left hand pain.    Started 2 months ago after he swung his arm back into the bracket of the garage door. Immediately painful. 2-3 at its worse. Dull achy. Playing guitar and typing at work instigate the pain. Does not radiate. Iced initially. Has not trialed NSAID or PT. No numbness or tingling.    PMH, Medications and Allergies were reviewed and updated as needed.    ROS:  As noted above otherwise negative.    Patient Active Problem List   Diagnosis   (none) - all problems resolved or deleted       Current Outpatient Medications   Medication Sig Dispense Refill     diclofenac (VOLTAREN) 75 MG EC tablet Take 1 tablet (75 mg) by mouth 2 times daily for 14 days 28 tablet 0     multivitamin, therapeutic (THERA-VIT) TABS tablet Take 1 tablet by mouth daily              OBJECTIVE:       Vitals:   Vitals:    08/17/22 1424   Weight: 98 kg (216 lb)   Height: 1.88 m (6' 2\")     BMI: Body mass index is 27.73 kg/m .    Gen:  Well nourished and in no acute distress  HEENT: Extraocular movement intact  Neck: Supple  Pulm:  Breathing Comfortably. No increased respiratory effort.  Psych: Euthymic. Appropriately answers questions    MSK:   Musculoskeletal: left hand  Inspection: + swelling over 2nd MCP joint pointer finger, no ecchymosis or obvious deformity.  Skin is intact.  Palpation: Tenderness to palpation of interweb space of 2nd and 3rd MCP joints.  Nontender throughout the remainder of hand and fingers. No tenderness to palpation overlying anatomical snuffbox.   Range of motion: Good overall range of motion of thumb in all planes.  Fingers with normal flexion and extension.  Flexion of all digits at the MCP joint reproduces his pain.  FDP and FDS intact. EDC and EIP intact.   No rotational malalignment or scissoring of " digits.  Strength: Full strength of flexion and extension of digits.   strength normal compared bilaterally.   Special Tests:   -Thumb opposition: intact  -Hand intrinsics: Intact  -Medial and lateral stress of MCP joint of second digit: No laxity  Neurologic: Sensation intact throughout hand and fingers  Vascular: Capillary refill <2s.  Digits warm, well perfused.     XRAY of left hand:   Findings:     PA, oblique and lateral view(s) of the left hand were obtained.      No acute osseous abnormality. Subtle cortical irregularity of the  ulnar styloid.     No significant degenerative changes.     Soft tissue is unremarkable.                                                                      Impression:  1. No acute osseous abnormality.  2. No significant degenerative changes.           ASSESSMENT and PLAN:     Vasile was seen today for pain.    Diagnoses and all orders for this visit:    Metacarpophalangeal joint pain of left hand  -     diclofenac (VOLTAREN) 75 MG EC tablet; Take 1 tablet (75 mg) by mouth 2 times daily for 14 days      Jose is a pleasant healthy 52-year-old male who presents with 2 months of of left hand pain after traumatically hitting it against a garage door, found to have enlarged second MCP joint on exam, tenderness to palpation between the second and third metacarpal joints on the dorsal side, with radiographs today in clinic unrevealing of any osseous abnormality.  This is most suggestive of a second MCP joint effusion versus synovitis and or bone bruise.  We will start with 2 weeks of anti-inflammatory, and if after 2 weeks no improvement he should return to clinic for ultrasound of this joint    - Diclofenac 75 mg bid for 2 weeks then rtc for US of joint if no improvement  - discharged with Co-band today for wrapping and supporting fingers    Return sooner if develops new or worsening symptoms.    Options for treatment and/or follow-up care were reviewed with the patient was actively  involved in the decision making process. Patient verbalized understanding and was in agreement with the plan.    The patient was seen by and discussed with attending physician Dr.Suzanne KIRILL Bates MD, CAQ, CCD, who agrees with the plan as stated unless otherwise stated.     Melvin Post DO  Primary Care Sports Medicine Fellow

## 2022-08-22 NOTE — PROGRESS NOTES
Attending Note:   I have examined this patient/images and have reviewed the clinical presentation and progress note with the fellow. I agree with the treatment plan as outlined. The plan was formulated with the fellow on the day of the patient's visit.    We discussed the side effects of NSAIDs and asked him to take the diclofenac with meals and to avoid other NSAIDs.  Ok to use Tylenol.     Lizeth Bates MD, CAQ, CCD  [facility]  Sports Medicine and Bone Health

## 2022-08-27 ENCOUNTER — HEALTH MAINTENANCE LETTER (OUTPATIENT)
Age: 52
End: 2022-08-27

## 2022-09-08 ENCOUNTER — OFFICE VISIT (OUTPATIENT)
Dept: ORTHOPEDICS | Facility: CLINIC | Age: 52
End: 2022-09-08
Payer: COMMERCIAL

## 2022-09-08 DIAGNOSIS — M25.542 METACARPOPHALANGEAL JOINT PAIN OF LEFT HAND: Primary | ICD-10-CM

## 2022-09-08 PROCEDURE — 99213 OFFICE O/P EST LOW 20 MIN: CPT | Mod: GC | Performed by: FAMILY MEDICINE

## 2022-09-08 NOTE — PROGRESS NOTES
Attending Note:   I have examined this patient/images and have reviewed the clinical presentation and progress note with the fellow. I agree with the treatment plan as outlined. The plan was formulated with the fellow on the day of the patient's visit.    > 30 min of total time spent in one-on-one evalution and discussion with patient regarding nature of problem, course, prior treatments, and therapeutic options,> 50% of which was spent in counseling and coordination of care:    Lizeth Bates MD, CAQ, CCD  [facility]  Sports Medicine and Bone Health

## 2022-09-08 NOTE — PROGRESS NOTES
"[facility]  Sports Medicine Clinic  Clinics and Surgery Center              SUBJECTIVE       Vasile Stallworth is a 52 year old male presenting to clinic today with follow up of 2nd MCP pain and effusion.    On 8/17 roughly 3 weeks ago:  \"presents with 2 months of of left hand pain after traumatically hitting it against a garage door, found to have enlarged second MCP joint on exam, tenderness to palpation between the second and third metacarpal joints on the dorsal side, with radiographs today in clinic unrevealing of any osseous abnormality.  This is most suggestive of a second MCP joint effusion versus synovitis and or bone bruise.  We will start with 2 weeks of anti-inflammatory, and if after 2 weeks no improvement he should return to clinic for ultrasound of this joint  - Diclofenac 75 mg bid for 2 weeks then rtc for US of joint if no improvement  - discharged with Co-band today for wrapping and supporting fingers\"     Since our last visit with him he has completed the anti-inflammatory course.  His pain improved and the swelling reduced, but this weekend he was helping move his child into college and taking apart bunk beds already injured this hand and the pain has flared again.  I will have them    PMH, Medications and Allergies were reviewed and updated as needed.    ROS:  As noted above otherwise negative.    Patient Active Problem List   Diagnosis   (none) - all problems resolved or deleted       Current Outpatient Medications   Medication Sig Dispense Refill     diclofenac (VOLTAREN) 75 MG EC tablet Take 1 tablet (75 mg) by mouth 2 times daily for 14 days 28 tablet 0     multivitamin, therapeutic (THERA-VIT) TABS tablet Take 1 tablet by mouth daily              OBJECTIVE:       Vitals: There were no vitals filed for this visit.  BMI: There is no height or weight on file to calculate BMI.    Gen:  Well nourished and in no acute distress  HEENT: Extraocular movement intact  Pulm:  Breathing " Comfortably. No increased respiratory effort.  Psych: Euthymic. Appropriately answers questions    MSK:   Musculoskeletal: left hand  Inspection: + much less swelling over 2nd MCP joint pointer finger, no ecchymosis or obvious deformity.  Skin is intact.  Palpation: No tenderness to palpation of interweb space of 2nd and 3rd MCP joints.  Nontender throughout the remainder of hand and fingers. No tenderness to palpation overlying anatomical snuffbox.   Range of motion: Good overall range of motion of thumb in all planes.  Fingers with normal flexion and extension.  Strength: Full strength of flexion and extension of digits.   strength normal compared bilaterally.   Neurologic: Sensation intact throughout hand and fingers  Vascular: Capillary refill <2s.  Digits warm, well perfused.            ASSESSMENT and PLAN:     There are no diagnoses linked to this encounter.    Jose is a pleasant healthy 52-year-old male who is following up today with 2 months of of left hand pain after traumatically hitting it against a garage door.  He completed his 2-week course of anti-inflammatory which improved the pain.  However it appears he has not reinjured it this past weekend.  On exam today the swelling is much reduced.  On ultrasound today in clinic there appears to be some fluid in the tendon sheath across the second metacarpal phalangeal joint likely representing tenosynovitis.  We will refer to occupational therapy and follow-up in a few weeks and can consider at that point a tendon sheath injection with steroid.    - referral to OT  -iontopheresis vs tendon sheath injection. Patient will communicate with us if OT needs specific order  -RTC in 3-4 weeks    Return sooner if develops new or worsening symptoms.    Options for treatment and/or follow-up care were reviewed with the patient was actively involved in the decision making process. Patient verbalized understanding and was in agreement with the plan.    The patient was  seen by and discussed with attending physician Dr.Suzanne KIRILL Bates MD, CAQ, CCD, who agrees with the plan as stated unless otherwise stated.     Melvin Post DO  Primary Care Sports Medicine Fellow

## 2022-09-08 NOTE — LETTER
"  9/8/2022      RE: Vasile Stallworth  [address] [address]     Dear Colleague,    Thank you for referring your patient, Vasile Stallworth, to the [facility] SPORTS MEDICINE CLINIC [place]. Please see a copy of my visit note below.    [facility]  Sports Medicine Clinic  Clinics and Surgery Center              SUBJECTIVE       Vasile Stallworth is a 52 year old male presenting to clinic today with follow up of 2nd MCP pain and effusion.    On 8/17 roughly 3 weeks ago:  \"presents with 2 months of of left hand pain after traumatically hitting it against a garage door, found to have enlarged second MCP joint on exam, tenderness to palpation between the second and third metacarpal joints on the dorsal side, with radiographs today in clinic unrevealing of any osseous abnormality.  This is most suggestive of a second MCP joint effusion versus synovitis and or bone bruise.  We will start with 2 weeks of anti-inflammatory, and if after 2 weeks no improvement he should return to clinic for ultrasound of this joint  - Diclofenac 75 mg bid for 2 weeks then rtc for US of joint if no improvement  - discharged with Co-band today for wrapping and supporting fingers\"     Since our last visit with him he has completed the anti-inflammatory course.  His pain improved and the swelling reduced, but this weekend he was helping move his child into college and taking apart bunk beds already injured this hand and the pain has flared again.  I will have them    PMH, Medications and Allergies were reviewed and updated as needed.    ROS:  As noted above otherwise negative.    Patient Active Problem List   Diagnosis   (none) - all problems resolved or deleted       Current Outpatient Medications   Medication Sig Dispense Refill     diclofenac (VOLTAREN) 75 MG EC tablet Take 1 tablet (75 mg) by mouth 2 times daily for 14 days 28 tablet 0     multivitamin, therapeutic (THERA-VIT) TABS tablet Take 1 tablet by mouth daily "              OBJECTIVE:       Vitals: There were no vitals filed for this visit.  BMI: There is no height or weight on file to calculate BMI.    Gen:  Well nourished and in no acute distress  HEENT: Extraocular movement intact  Pulm:  Breathing Comfortably. No increased respiratory effort.  Psych: Euthymic. Appropriately answers questions    MSK:   Musculoskeletal: left hand  Inspection: + much less swelling over 2nd MCP joint pointer finger, no ecchymosis or obvious deformity.  Skin is intact.  Palpation: No tenderness to palpation of interweb space of 2nd and 3rd MCP joints.  Nontender throughout the remainder of hand and fingers. No tenderness to palpation overlying anatomical snuffbox.   Range of motion: Good overall range of motion of thumb in all planes.  Fingers with normal flexion and extension.  Strength: Full strength of flexion and extension of digits.   strength normal compared bilaterally.   Neurologic: Sensation intact throughout hand and fingers  Vascular: Capillary refill <2s.  Digits warm, well perfused.            ASSESSMENT and PLAN:     There are no diagnoses linked to this encounter.    Jose is a pleasant healthy 52-year-old male who is following up today with 2 months of of left hand pain after traumatically hitting it against a garage door.  He completed his 2-week course of anti-inflammatory which improved the pain.  However it appears he has not reinjured it this past weekend.  On exam today the swelling is much reduced.  On ultrasound today in clinic there appears to be some fluid in the tendon sheath across the second metacarpal phalangeal joint likely representing tenosynovitis.  We will refer to occupational therapy and follow-up in a few weeks and can consider at that point a tendon sheath injection with steroid.    - referral to OT  -iontopheresis vs tendon sheath injection. Patient will communicate with us if OT needs specific order  -RTC in 3-4 weeks    Return sooner if develops  new or worsening symptoms.    Options for treatment and/or follow-up care were reviewed with the patient was actively involved in the decision making process. Patient verbalized understanding and was in agreement with the plan.    The patient was seen by and discussed with attending physician Dr.Suzanne KIRILL Bates MD, CAQ, CCD, who agrees with the plan as stated unless otherwise stated.     Melvin Post DO  Primary Care Sports Medicine Fellow      Attending Note:   I have examined this patient/images and have reviewed the clinical presentation and progress note with the fellow. I agree with the treatment plan as outlined. The plan was formulated with the fellow on the day of the patient's visit.    > 30 min of total time spent in one-on-one evalution and discussion with patient regarding nature of problem, course, prior treatments, and therapeutic options,> 50% of which was spent in counseling and coordination of care:    Lizeth Bates MD, CAQ, CCD  [facility]  Sports Medicine and Bone Health

## 2022-09-09 NOTE — TELEPHONE ENCOUNTER
DIAGNOSIS: Right shoulder pain    APPOINTMENT DATE: 9.22.22   NOTES STATUS DETAILS   OFFICE NOTE from other specialist Internal 8.2.22 Dr Julia Bergeron, M[facility]   PT in Epic   MEDICATION LIST Internal

## 2022-09-18 NOTE — PROGRESS NOTES
"  Hand Therapy Initial Evaluation    Current Date:  9/19/202204  Diagnosis: Metacarpophalangeal joint pain of left hand, extensor tendonitis of 2nd digit across MCP joint  DOI: 9/8/22, ~3 mo onset   DOS: NA  Procedure:  NA    Precautions: None    Per MD 9/8/22  Jose is a pleasant healthy 52-year-old male who is following up today with 2 months of of left hand pain after traumatically hitting it against a garage door.  He completed his 2-week course of anti-inflammatory which improved the pain.  However it appears he has not reinjured it this past weekend.  On exam today the swelling is much reduced.  On ultrasound today in clinic there appears to be some fluid in the tendon sheath across the second metacarpal phalangeal joint likely representing tenosynovitis.  We will refer to occupational therapy and follow-up in a few weeks and can consider at that point a tendon sheath injection with steroid.     Subjective:  Vasile Stallworth is a 52 year old male.    Patient reports symptoms of the left IF and MF MCPs which occurred due to hitting on garage door. Since onset symptoms are Unchanged      Answers for HPI/ROS submitted by the patient on 9/19/2022  Reason for Visit:: Hand therapy  When problem began:: 6/15/2022  How problem occurred:: Hit back of hand on garage door  Number scale: 2/10  General health as reported by patient: fair, good  Please check all that apply to your current or past medical history: none  Medical allergies: none  Surgeries: orthopedic surgery  Medications you are currently taking: none  Occupation:: IT  What are your primary job tasks: computer work    Occupational Profile Information:  Right hand dominant  Prior functional level:  no limitations  Patient reports symptoms of pain, stiffness/loss of motion, weakness/loss of strength and edema  Special tests:  x-ray, ultrasound   radiographs unrevealing of any osseous abnormality  \"On ultrasound today in clinic there appears to be some fluid in the " "tendon sheath across the second metacarpal phalangeal joint likely representing tenosynovitis.\"    Previous treatment: Antiinflammatories from MD   Barriers include:none  Mobility: No difficulty  Transportation: drives  Currently working in normal job without restrictions  Leisure activities/hobbies: Video games, guitar, cooking   Other: Prior R RTC surgery, some pain noted in R shoulder - to get further assessed.     Functional Outcome Measure:   Upper Extremity Functional Index Score:  SCORE:   Column Totals: /80: (P) 66   (A lower score indicates greater disability.)    Objective:  Pain Level (Scale 0-10)   9/19/2022   At Rest 0   With Use 3-4      Pain Description  Date 9/19/2022   Location Dorsal IF MCP and some noted in distal interossei space between IF and MF    Pain Quality Aching, Dull, Nagging and Tender   Frequency intermittent     Pain is worst  daytime   Exacerbated by  Guitar, gripping, pressure, twisting, holding controller with neo - does not notice as much with \"normal life\".    Relieved by cold and rest   Progression Same, inconsistent      Edema (Circumference measured in cm)   9/19/2022 9/19/2022   Finger  R L   P1 IF 7.3 7.2   P1 MF  7.1 6.1     Sensation   WNL throughout all nerve distributions; per patient report    ROM  Hand 9/19/2022 9/19/2022   AROM(PROM) R L   Index MP 0/90 0/90   PIP 0/95 0/65+   DIP 0/90 0/60+   [place] 275 215   Long MP 0/90 0/90   PIP 0/95 0/100+   DIP 0/95 0/70+   [place] 280 260     Strength   (Measured in pounds)  Pain Report: - none  + mild    ++ moderate    +++ severe    9/19/2022 9/19/2022   Trials R L   1  2  3 100 80   Average       Lat Pinch 9/19/2022 9/19/2022   Trials R L   1  2  3 19.5 18   Average       3 Pt Pinch 9/19/2022 9/19/2022   Trials R L   1  2  3 21.5 16.5+   Averagez       Palpation    IF MCP +  2nd DI +     Other  Denies pain with composite stretch of wrist and digits with extended elbow.     Assessment:  Patient presents with symptoms " consistent with diagnosis of left hand, index finger and long finger, with conservative intervention.     Patient's limitations or Problem List includes:  Pain, Decreased ROM/motion, Increased edema, Weakness, Hypomobility, Decreased , Decreased pinch, Decreased coordination and Decreased dexterity of the left hand, index finger and long finger which interferes with the patient's ability to perform Self Care Tasks (dressing, bathing), Work Tasks, Recreational Activities and Household Chores as compared to previous level of function.    Rehab Potential:  Excellent - Return to full activity, no limitations    Patient will benefit from skilled Occupational Therapy to increase ROM, flexibility, motion, overall strength,  strength, pinch strength, coordination and dexterity and decrease pain and edema to return to previous activity level and resume normal daily tasks and to reach their rehab potential.    Barriers to Learning:  No barrier    Communication Issues:  Patient appears to be able to clearly communicate and understand verbal and written communication and follow directions correctly.    Chart Review: Chart Review and Simple history review with patient    Identified Performance Deficits: bathing/showering, dressing, functional mobility, health management and maintenance, home establishment and management, meal preparation and cleanup, work and leisure activities    Assessment of Occupational Performance:  5 or more Performance Deficits    Clinical Decision Making (Complexity): Low complexity    Treatment Explanation:  The following has been discussed with the patient:  RX ordered/plan of care  Anticipated outcomes  Possible risks and side effects    Plan:  Frequency:  1 X week, once daily  Duration:  for 8 weeks    Treatment Plan:    Modalities:    US, Iontophoresis, Fluidotherapy and Paraffin   Therapeutic Exercise:    AROM, AAROM, PROM, Tendon Gliding, Blocking, Reverse Blocking, Extensor Tracking,  Isotonics, Isometrics and Stabilization  Therapeutic Activities:   Functional activities   Neuromuscular re-ed:   Nerve Gliding, Coordination/Dexterity, Posture, Kinesiotaping, Isometrics and Stabilization  Manual Techniques:   Coordination/Dexterity, Joint mobilization, Friction massage, Myofascial release and Manual edema mobilization  Orthotic Fabrication:    Static, Static progressive and Dynamic  Self Care:    Self Care Tasks, Ergonomic Considerations and Work Tasks    Discharge Plan:  Achieve all LTG.  Independent in home treatment program.  Reach maximal therapeutic benefit.    Home Exercise Program  L radial gutter including IF and MF, hand based             PTRX Report  The following values have been sent to [facility].  Orthosis Wear and Care  EMR Notes  HEP - Sets  Reps  Sessions per day  Notes  Finger Active Range of Motion Tendon Glides Fist Series  EMR Notes  HEP - Sets  Reps 5-10  Sessions per day 2-3  Notes  Icing  EMR Notes  HEP - Sets  Reps  Sessions per day  Notes      Next Visit  Assess orthosis   Assess HEP  STM  MEM  Gentle ROM  Wrapping?   Reference protocol - see sticky note    Orthosis wear 4-8 weeks?

## 2022-09-19 ENCOUNTER — THERAPY VISIT (OUTPATIENT)
Dept: OCCUPATIONAL THERAPY | Facility: CLINIC | Age: 52
End: 2022-09-19
Attending: FAMILY MEDICINE
Payer: COMMERCIAL

## 2022-09-19 DIAGNOSIS — M25.542 METACARPOPHALANGEAL JOINT PAIN OF LEFT HAND: ICD-10-CM

## 2022-09-19 DIAGNOSIS — M79.645 PAIN OF FINGER OF LEFT HAND: ICD-10-CM

## 2022-09-19 PROCEDURE — 97760 ORTHOTIC MGMT&TRAING 1ST ENC: CPT | Mod: GO | Performed by: OCCUPATIONAL THERAPIST

## 2022-09-19 PROCEDURE — 97110 THERAPEUTIC EXERCISES: CPT | Mod: GO | Performed by: OCCUPATIONAL THERAPIST

## 2022-09-19 PROCEDURE — 97165 OT EVAL LOW COMPLEX 30 MIN: CPT | Mod: GO | Performed by: OCCUPATIONAL THERAPIST

## 2022-09-20 DIAGNOSIS — M25.511 RIGHT SHOULDER PAIN: Primary | ICD-10-CM

## 2022-09-22 ENCOUNTER — OFFICE VISIT (OUTPATIENT)
Dept: ORTHOPEDICS | Facility: CLINIC | Age: 52
End: 2022-09-22
Payer: COMMERCIAL

## 2022-09-22 ENCOUNTER — PRE VISIT (OUTPATIENT)
Dept: ORTHOPEDICS | Facility: CLINIC | Age: 52
End: 2022-09-22

## 2022-09-22 ENCOUNTER — ANCILLARY PROCEDURE (OUTPATIENT)
Dept: GENERAL RADIOLOGY | Facility: CLINIC | Age: 52
End: 2022-09-22
Attending: STUDENT IN AN ORGANIZED HEALTH CARE EDUCATION/TRAINING PROGRAM
Payer: COMMERCIAL

## 2022-09-22 VITALS — HEIGHT: 74 IN | WEIGHT: 216 LBS | BODY MASS INDEX: 27.72 KG/M2

## 2022-09-22 DIAGNOSIS — M25.511 RIGHT SHOULDER PAIN: ICD-10-CM

## 2022-09-22 DIAGNOSIS — S49.91XA INJURY OF RIGHT ACROMIOCLAVICULAR JOINT, INITIAL ENCOUNTER: Primary | ICD-10-CM

## 2022-09-22 PROCEDURE — 73030 X-RAY EXAM OF SHOULDER: CPT | Mod: RT | Performed by: RADIOLOGY

## 2022-09-22 PROCEDURE — 99214 OFFICE O/P EST MOD 30 MIN: CPT | Mod: GC | Performed by: FAMILY MEDICINE

## 2022-09-22 RX ORDER — DICLOFENAC SODIUM 75 MG/1
75 TABLET, DELAYED RELEASE ORAL 2 TIMES DAILY
Qty: 28 TABLET | Refills: 0 | Status: SHIPPED | OUTPATIENT
Start: 2022-09-22 | End: 2022-12-08

## 2022-09-22 NOTE — LETTER
9/22/2022      RE: Vasile Stallworth  [address] [facility] [address]     Dear Colleague,    Thank you for referring your patient, Vasile Stallworth, to the [facility] [place]. Please see a copy of my visit note below.    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    Background:   Date of injury: NA  Duration of symptoms: About 2 months ago  Mechanism of Injury: He was leveling pavers with a heavy bag and started to have shoulder pain after that.   Intensity: 8/10 at worst  Aggravating factors: Putting a shirt on, overhead motions, lifting  Relieving Factors: Stretching, anti-inflammatories  Prior Evaluation: Previous rotator cuff surgery and bicipital tendon repair in November 2017    Regarding his chronic left hand pain after traumatically hitting it against a garage door.  He had completed a 2-week course of anti-inflammatory which improved the pain, but likely re-injured it again. Two weeks ago, we  performed ultrasound of 2nd MCP joint and there appeared to be some fluid in the tendon sheath across the second metacarpal phalangeal joint likely representing tenosynovitis.  We referred him to occupational therapy. He has had one visit with OT, who recommended exercises and madecustom orthosis, which he should be worn 4-8 weeks.     PMH, Medications and Allergies were reviewed and updated as needed.    ROS:  As noted above otherwise negative.    Patient Active Problem List   Diagnosis     Pain of finger of left hand       Current Outpatient Medications   Medication Sig Dispense Refill     diclofenac (VOLTAREN) 75 MG EC tablet Take 1 tablet (75 mg) by mouth 2 times daily for 14 days 28 tablet 0     multivitamin, therapeutic  "(THERA-VIT) TABS tablet Take 1 tablet by mouth daily       diclofenac (VOLTAREN) 75 MG EC tablet Take 1 tablet (75 mg) by mouth 2 times daily for 14 days 28 tablet 0            OBJECTIVE:       Vitals:   Vitals:    09/22/22 1412   Weight: 98 kg (216 lb)   Height: 1.88 m (6' 2\")     BMI: Body mass index is 27.73 kg/m .    Gen:  Well nourished and in no acute distress  HEENT: Extraocular movement intact  Neck: Supple  Pulm:  Breathing Comfortably. No increased respiratory effort.  Psych: Euthymic. Appropriately answers questions    MSK:   SHOULDER: right  Musculoskeletal - shoulder  - inspection: normal bone and joint alignment, no obvious deformity, no scapular winging, no AC step-off  - palpation: + bony tenderness over ac joint no soft tissue tenderness, normal clavicle, non-tender AC  - ROM full but performed slowly and with caution:  180 deg flexion   45 deg extension   150 deg abduction   90 deg ER   70 deg IR  - strength: 5/5  strength, 5/5 in all shoulder planes  - special tests:  (-) Speed's  (-) Neer  (+) Hawkin's  (-) cross arm adduction  (-) Charley  (-) [place]'s  (-) apprehension  (+) subscap lift-off: unable to lift off  Neuro  - no sensory or motor deficit, grossly normal coordination, normal muscle tone  Skin  - no ecchymosis, erythema, warmth, or induration, no obvious rash      XRAY Right shoulder today:  Impression:  1. No acute osseous abnormality.  2. No substantial glenohumeral degenerative change.     Ultrasound of acromioclavicular joint performed in clinic today showed swelling at the joint compared to left acromioclavicular joint.           ASSESSMENT and PLAN:     Vasile was seen today for consult.    Diagnoses and all orders for this visit:    Injury of right acromioclavicular joint, initial encounter  -     diclofenac (VOLTAREN) 75 MG EC tablet; Take 1 tablet (75 mg) by mouth 2 times daily for 14 days      Vasile Stallworth is a 52 year old male followed by our clinic for post traumatic " chronic left 2nd MCP pain and tenosynovitis presenting to clinic today for new complaint of chronic right shoulder pain.  Of note rotator cuff and biceps tendon repairs were performed several years ago and this shoulder.  Exam today notable for positive liftoff and tenderness over the AC joint.  Radiographs performed today showed no osseous abnormality other than mild degenerative changes of the AC joint actually seen more clearly on ultrasound which then also revealed joint effusion compared to the opposite side.  He received pain relief in his shoulder from the anti-inflammatory course that we prescribed for his tenosynovitis of his hand so we will prescribe another 2-week course given this positive response.  He will return to clinic in 3 weeks for a corticosteroid injection of his acromioclavicular joint.    Return sooner if develops new or worsening symptoms.    Options for treatment and/or follow-up care were reviewed with the patient was actively involved in the decision making process. Patient verbalized understanding and was in agreement with the plan.    The patient was seen by and discussed with attending physician Dr.Suzanne KIRILL Bates MD, CAQ, CCD, who agrees with the plan as stated unless otherwise stated.     Melvin Post DO  Primary Care Sports Medicine Fellow note    Attending Note:   I have examined this patient/images and have reviewed the clinical presentation and progress note with the fellow. I agree with the treatment plan as outlined. The plan was formulated with the fellow on the day of the patient's visit.   Lizeth Bates MD, CAQ, CCD  [facility]  Sports Medicine and Bone Health

## 2022-09-22 NOTE — PROGRESS NOTES
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    Background:   Date of injury: NA  Duration of symptoms: About 2 months ago  Mechanism of Injury: He was leveling pavers with a heavy bag and started to have shoulder pain after that.   Intensity: 8/10 at worst  Aggravating factors: Putting a shirt on, overhead motions, lifting  Relieving Factors: Stretching, anti-inflammatories  Prior Evaluation: Previous rotator cuff surgery and bicipital tendon repair in November 2017    Regarding his chronic left hand pain after traumatically hitting it against a garage door.  He had completed a 2-week course of anti-inflammatory which improved the pain, but likely re-injured it again. Two weeks ago, we  performed ultrasound of 2nd MCP joint and there appeared to be some fluid in the tendon sheath across the second metacarpal phalangeal joint likely representing tenosynovitis.  We referred him to occupational therapy. He has had one visit with OT, who recommended exercises and madecustom orthosis, which he should be worn 4-8 weeks.     PMH, Medications and Allergies were reviewed and updated as needed.    ROS:  As noted above otherwise negative.    Patient Active Problem List   Diagnosis     Pain of finger of left hand       Current Outpatient Medications   Medication Sig Dispense Refill     diclofenac (VOLTAREN) 75 MG EC tablet Take 1 tablet (75 mg) by mouth 2 times daily for 14 days 28 tablet 0     multivitamin, therapeutic (THERA-VIT) TABS tablet Take 1 tablet by mouth daily       diclofenac (VOLTAREN) 75 MG EC tablet Take 1 tablet (75 mg) by mouth 2 times daily for 14 days 28 tablet 0            OBJECTIVE:       Vitals:   Vitals:    09/22/22 1412   Weight: 98 kg (216 lb)   Height:  "1.88 m (6' 2\")     BMI: Body mass index is 27.73 kg/m .    Gen:  Well nourished and in no acute distress  HEENT: Extraocular movement intact  Neck: Supple  Pulm:  Breathing Comfortably. No increased respiratory effort.  Psych: Euthymic. Appropriately answers questions    MSK:   SHOULDER: right  Musculoskeletal - shoulder  - inspection: normal bone and joint alignment, no obvious deformity, no scapular winging, no AC step-off  - palpation: + bony tenderness over ac joint no soft tissue tenderness, normal clavicle, non-tender AC  - ROM full but performed slowly and with caution:  180 deg flexion   45 deg extension   150 deg abduction   90 deg ER   70 deg IR  - strength: 5/5  strength, 5/5 in all shoulder planes  - special tests:  (-) Speed's  (-) Neer  (+) Hawkin's  (-) cross arm adduction  (-) Charley  (-) [place]'s  (-) apprehension  (+) subscap lift-off: unable to lift off  Neuro  - no sensory or motor deficit, grossly normal coordination, normal muscle tone  Skin  - no ecchymosis, erythema, warmth, or induration, no obvious rash      XRAY Right shoulder today:  Impression:  1. No acute osseous abnormality.  2. No substantial glenohumeral degenerative change.     Ultrasound of acromioclavicular joint performed in clinic today showed swelling at the joint compared to left acromioclavicular joint.           ASSESSMENT and PLAN:     Vasile was seen today for consult.    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  Of note rotator cuff and biceps tendon repairs were performed several years ago and this shoulder.  Exam today notable for positive liftoff and " tenderness over the AC joint.  Radiographs performed today showed no osseous abnormality other than mild degenerative changes of the AC joint actually seen more clearly on ultrasound which then also revealed joint effusion compared to the opposite side.  He received pain relief in his shoulder from the anti-inflammatory course that we prescribed for his tenosynovitis of his hand so we will prescribe another 2-week course given this positive response.  He will return to clinic in 3 weeks for a corticosteroid injection of his acromioclavicular joint.    Return sooner if develops new or worsening symptoms.    Options for treatment and/or follow-up care were reviewed with the patient was actively involved in the decision making process. Patient verbalized understanding and was in agreement with the plan.    The patient was seen by and discussed with attending physician Dr.Suzanne KIRILL Bates MD, CAQ, CCD, who agrees with the plan as stated unless otherwise stated.     Melvin Post DO  Primary Care Sports Medicine Fellow note

## 2022-09-25 NOTE — PROGRESS NOTES
Attending Note:   I have examined this patient/images and have reviewed the clinical presentation and progress note with the fellow. I agree with the treatment plan as outlined. The plan was formulated with the fellow on the day of the patient's visit.   Lizeth Bates MD, CAQ, CCD  [facility]  Sports Medicine and Bone Health

## 2022-09-30 ENCOUNTER — THERAPY VISIT (OUTPATIENT)
Dept: OCCUPATIONAL THERAPY | Facility: CLINIC | Age: 52
End: 2022-09-30
Payer: COMMERCIAL

## 2022-09-30 DIAGNOSIS — M79.645 PAIN OF FINGER OF LEFT HAND: Primary | ICD-10-CM

## 2022-09-30 PROCEDURE — 97140 MANUAL THERAPY 1/> REGIONS: CPT | Mod: GO | Performed by: OCCUPATIONAL THERAPIST

## 2022-09-30 PROCEDURE — 97763 ORTHC/PROSTC MGMT SBSQ ENC: CPT | Mod: GO | Performed by: OCCUPATIONAL THERAPIST

## 2022-10-11 ENCOUNTER — THERAPY VISIT (OUTPATIENT)
Dept: OCCUPATIONAL THERAPY | Facility: CLINIC | Age: 52
End: 2022-10-11
Payer: COMMERCIAL

## 2022-10-11 DIAGNOSIS — M79.645 PAIN OF FINGER OF LEFT HAND: Primary | ICD-10-CM

## 2022-10-11 PROCEDURE — 97110 THERAPEUTIC EXERCISES: CPT | Mod: GO | Performed by: OCCUPATIONAL THERAPIST

## 2022-10-11 PROCEDURE — 97140 MANUAL THERAPY 1/> REGIONS: CPT | Mod: GO | Performed by: OCCUPATIONAL THERAPIST

## 2022-10-13 ENCOUNTER — OFFICE VISIT (OUTPATIENT)
Dept: ORTHOPEDICS | Facility: CLINIC | Age: 52
End: 2022-10-13
Payer: COMMERCIAL

## 2022-10-13 DIAGNOSIS — S49.91XD INJURY OF RIGHT ACROMIOCLAVICULAR JOINT, SUBSEQUENT ENCOUNTER: Primary | ICD-10-CM

## 2022-10-13 PROCEDURE — 20606 DRAIN/INJ JOINT/BURSA W/US: CPT | Mod: RT | Performed by: FAMILY MEDICINE

## 2022-10-13 PROCEDURE — 99207 PR DROP WITH A PROCEDURE: CPT | Mod: GC | Performed by: FAMILY MEDICINE

## 2022-10-13 RX ORDER — LIDOCAINE HYDROCHLORIDE 10 MG/ML
2 INJECTION, SOLUTION EPIDURAL; INFILTRATION; INTRACAUDAL; PERINEURAL
Status: SHIPPED | OUTPATIENT
Start: 2022-10-13

## 2022-10-13 RX ORDER — TRIAMCINOLONE ACETONIDE 40 MG/ML
40 INJECTION, SUSPENSION INTRA-ARTICULAR; INTRAMUSCULAR
Status: SHIPPED | OUTPATIENT
Start: 2022-10-13

## 2022-10-13 RX ADMIN — LIDOCAINE HYDROCHLORIDE 2 ML: 10 INJECTION, SOLUTION EPIDURAL; INFILTRATION; INTRACAUDAL; PERINEURAL at 13:26

## 2022-10-13 RX ADMIN — TRIAMCINOLONE ACETONIDE 40 MG: 40 INJECTION, SUSPENSION INTRA-ARTICULAR; INTRAMUSCULAR at 13:26

## 2022-10-13 NOTE — NURSING NOTE
[address] [address]  Dept: [phone]  ______________________________________________________________________________    Patient: Vasile Stallworth   : 1970   MRN: [id number]   2022    INVASIVE PROCEDURE SAFETY CHECKLIST    Date: 10/13/22   Procedure: Right AC joint USG CSI  Patient Name: Vasile Stallworth  MRN: [id number]  YOB: 1970    Action: Complete sections as appropriate. Any discrepancy results in a HARD COPY until resolved.     PRE PROCEDURE:  Patient ID verified with 2 identifiers (name and  or MRN): Yes  Procedure and site verified with patient/designee (when able): Yes  Accurate consent documentation in medical record: Yes  H&P (or appropriate assessment) documented in medical record: Yes  H&P must be up to 20 days prior to procedure and updates within 24 hours of procedure as applicable: NA  Relevant diagnostic and radiology test results appropriately labeled and displayed as applicable: Yes  Procedure site(s) marked with provider initials: NA    TIMEOUT:  Time-Out performed immediately prior to starting procedure, including verbal and active participation of all team members addressing the following:Yes  * Correct patient identify  * Confirmed that the correct side and site are marked  * An accurate procedure consent form  * Agreement on the procedure to be done  * Correct patient position  * Relevant images and results are properly labeled and appropriately displayed  * The need to administer antibiotics or fluids for irrigation purposes during the procedure as applicable   * Safety precautions based on patient history or medication use    DURING PROCEDURE: Verification of correct person, site, and procedures any time the responsibility for care of the patient is transferred to another member of the care team.       Prior to injection, verified patient identity using patient's name and date of birth.  Due  to injection administration, patient instructed to remain in clinic for 15 minutes  afterwards, and to report any adverse reaction to me immediately.    Joint injection was performed.      Drug Amount Wasted:  None.  Vial/Syringe: Single dose vial  Expiration Date:  7/1/24      Nathan Taveras, ATC  October 13, 2022

## 2022-10-13 NOTE — PROGRESS NOTES
Attending Note:   I have directly supervised the MSK US AC joint CSI injection.   Lizeth Bates MD, CAQ, CCD  [facility]  Sports Medicine and Bone Health

## 2022-10-13 NOTE — PROGRESS NOTES
[facility]  Sports Medicine Clinic  Clinics and Surgery Center              SUBJECTIVE       Vasile Stallworth is a 52 year old male presenting to clinic for follow up of osteoarthritis of Right AC joint and CSI.    PMH, Medications and Allergies were reviewed and updated as needed.    ROS:  As noted above otherwise negative.    Patient Active Problem List   Diagnosis     Pain of finger of left hand       Current Outpatient Medications   Medication Sig Dispense Refill     diclofenac (VOLTAREN) 75 MG EC tablet Take 1 tablet (75 mg) by mouth 2 times daily for 14 days 28 tablet 0     diclofenac (VOLTAREN) 75 MG EC tablet Take 1 tablet (75 mg) by mouth 2 times daily for 14 days 28 tablet 0     multivitamin, therapeutic (THERA-VIT) TABS tablet Take 1 tablet by mouth daily              OBJECTIVE:       Vitals: There were no vitals filed for this visit.  BMI: There is no height or weight on file to calculate BMI.    Gen:  Well nourished and in no acute distress  HEENT: Extraocular movement intact  Pulm:  Breathing Comfortably. No increased respiratory effort.  Psych: Euthymic. Appropriately answers questions           ASSESSMENT and PLAN:     Vasile was seen today for follow up.    Diagnoses and all orders for this visit:    Injury of right acromioclavicular joint, initial encounter  -     POC US GUIDANCE NEEDLE PLACEMENT    Other orders  -     Cancel: Small Joint Injection/Arthrocentesis  -     Medium Joint Injection: R acromioclavicular        Vasile Stallworth is a 52 year old male presenting to clinic for follow up of osteoarthritis of Right AC joint and CSI.    Right shoulder Injection - acromioclavicular space   The patient was informed of the risks and the benefits of the procedure and a written consent was signed.  The patient s right shoulder was prepped with chlorhexidine in sterile fashion.    2mL of 40 mg of triamcinolone acetate suspension was drawn up into a 5 mL syringe with 1 mL of 1%  lidocaine.  Injection was performed with sub-sterile technique.  A 1.5-inch 22-gauge needle was used to enter the acromioclavicular space at the posterior aspect of shoulder.  There were no complications. The patient tolerated the procedure well. There was negligible bleeding.   The patient was instructed torefrain from overuse over the next 3 days.   The patient was instructed to call or go to the emergency room with any unusual pain, swelling, redness, or if otherwise concerned.    Return sooner if develops new or worsening symptoms.    Options for treatment and/or follow-up care were reviewed with the patient was actively involved in the decision making process. Patient verbalized understanding and was in agreement with the plan.    The patient was seen by and discussed with attending physician Dr.Suzanne KIRILL Bates MD, CAQ, CCD, who agrees with the plan as stated unless otherwise stated.     Melvin Post DO  Primary Care Sports Medicine Fellow

## 2022-10-13 NOTE — LETTER
10/13/2022      RE: Vasile Stallworth  [address] [facility] [address]     Dear Colleague,    Thank you for referring your patient, Vasile Stallworth, to the [facility] [place]. Please see a copy of my visit note below.    [facility]  Sports Medicine Clinic  Clinics and Surgery Center              SUBJECTIVE       Vasile Stallworth is a 52 year old male presenting to clinic for follow up of osteoarthritis of Right AC joint and CSI.    PMH, Medications and Allergies were reviewed and updated as needed.    ROS:  As noted above otherwise negative.    Patient Active Problem List   Diagnosis     Pain of finger of left hand       Current Outpatient Medications   Medication Sig Dispense Refill     diclofenac (VOLTAREN) 75 MG EC tablet Take 1 tablet (75 mg) by mouth 2 times daily for 14 days 28 tablet 0     diclofenac (VOLTAREN) 75 MG EC tablet Take 1 tablet (75 mg) by mouth 2 times daily for 14 days 28 tablet 0     multivitamin, therapeutic (THERA-VIT) TABS tablet Take 1 tablet by mouth daily              OBJECTIVE:       Vitals: There were no vitals filed for this visit.  BMI: There is no height or weight on file to calculate BMI.    Gen:  Well nourished and in no acute distress  HEENT: Extraocular movement intact  Pulm:  Breathing Comfortably. No increased respiratory effort.  Psych: Euthymic. Appropriately answers questions           ASSESSMENT and PLAN:     Vasile was seen today for follow up.    Diagnoses and all orders for this visit:    Injury of right acromioclavicular joint, initial encounter  -     POC US GUIDANCE NEEDLE PLACEMENT    Other orders  -     Cancel: Small Joint Injection/Arthrocentesis  -     Medium Joint Injection: R acromioclavicular        Vasile Stallworth is a 52 year old male presenting to clinic for follow up of osteoarthritis of Right AC joint and CSI.    Right shoulder Injection - acromioclavicular space   The patient was informed of the risks and the  benefits of the procedure and a written consent was signed.  The patient s right shoulder was prepped with chlorhexidine in sterile fashion.    2mL of 40 mg of triamcinolone acetate suspension was drawn up into a 5 mL syringe with 1 mL of 1% lidocaine.  Injection was performed with sub-sterile technique.  A 1.5-inch 22-gauge needle was used to enter the acromioclavicular space at the posterior aspect of shoulder.  There were no complications. The patient tolerated the procedure well. There was negligible bleeding.   The patient was instructed torefrain from overuse over the next 3 days.   The patient was instructed to call or go to the emergency room with any unusual pain, swelling, redness, or if otherwise concerned.    Return sooner if develops new or worsening symptoms.    Options for treatment and/or follow-up care were reviewed with the patient was actively involved in the decision making process. Patient verbalized understanding and was in agreement with the plan.    The patient was seen by and discussed with attending physician Dr.Suzanne KIRILL Bates MD, CAQ, CCD, who agrees with the plan as stated unless otherwise stated.     Melvin Post DO  Primary Care Sports Medicine Fellow      Medium Joint Injection: R acromioclavicular    Date/Time: 10/13/2022 1:26 PM  Performed by: Melvin Post MD  Authorized by: Melvin Post MD     Indications:  Pain  Needle Size:  25 G  Guidance: ultrasound    Approach:  Superior  Location:  Shoulder  Site:  R acromioclavicular  Medications:  40 mg triamcinolone 40 MG/ML; 2 mL lidocaine (PF) 1 %  Outcome:  Tolerated well, no immediate complications  Procedure discussed: discussed risks, benefits, and alternatives    Consent Given by:  Patient  Timeout: timeout called immediately prior to procedure    Prep: patient was prepped and draped in usual sterile fashion     Nathan Taveras ATC on 10/13/2022 at 1:27 PM        Attending Note:   I have directly supervised the  MSK US AC joint CSI injection.   Lizeth Bates MD, CAQ, CCD  [facility]  Sports Medicine and Bone Health

## 2022-10-13 NOTE — PROGRESS NOTES
Medium Joint Injection: R acromioclavicular    Date/Time: 10/13/2022 1:26 PM  Performed by: Melvin Post MD  Authorized by: Melvin Post MD     Indications:  Pain  Needle Size:  25 G  Guidance: ultrasound    Approach:  Superior  Location:  Shoulder  Site:  R acromioclavicular  Medications:  40 mg triamcinolone 40 MG/ML; 2 mL lidocaine (PF) 1 %  Outcome:  Tolerated well, no immediate complications  Procedure discussed: discussed risks, benefits, and alternatives    Consent Given by:  Patient  Timeout: timeout called immediately prior to procedure    Prep: patient was prepped and draped in usual sterile fashion     Nathan Taveras ATC on 10/13/2022 at 1:27 PM

## 2022-10-26 ENCOUNTER — THERAPY VISIT (OUTPATIENT)
Dept: OCCUPATIONAL THERAPY | Facility: CLINIC | Age: 52
End: 2022-10-26
Payer: COMMERCIAL

## 2022-10-26 DIAGNOSIS — M79.645 PAIN OF FINGER OF LEFT HAND: Primary | ICD-10-CM

## 2022-10-26 PROCEDURE — 97110 THERAPEUTIC EXERCISES: CPT | Mod: GO | Performed by: OCCUPATIONAL THERAPIST

## 2022-10-26 NOTE — PROGRESS NOTES
"Hand Therapy Progress Note    Current Date:  10/26/2022    Diagnosis: Metacarpophalangeal joint pain of left hand, extensor tendonitis of 2nd digit across MCP joint  DOI: 9/8/22, ~3 mo onset   DOS: NA  Procedure:  NA    Precautions: None    Reporting period is 9/19/22 to 10/26/2022    Subjective:   Subjective changes noted by patient: \"Highly annoying\", but improved pain. 2/10 at worst. Pt weaning from night and day braces as tolerated - some tenderness returning when going without bracing. Pt has not returned to guitar playing.   Functional changes noted by patient:  Improvement in Self Care Tasks (bathing), Work Tasks, Recreational Activities and Household Chores  Patient has noted adverse reaction to:  None    Functional Outcome Measure:  Upper Extremity Functional Index Score:  SCORE:   Column Totals: /80: 73   (A lower score indicates greater disability.)    Objective:    Please refer to the daily flowsheet for treatment provided today.       Pain Level (Scale 0-10)   9/19/2022 10/26/22     At Rest 0 0   With Use 3-4  2     Pain Description  Date 9/19/2022   Location Dorsal IF MCP and some noted in distal interossei space between IF and MF    Pain Quality Aching, Dull, Nagging and Tender   Frequency intermittent     Pain is worst  daytime   Exacerbated by  Guitar, gripping, pressure, twisting, holding controller with neo - does not notice as much with \"normal life\".    Relieved by cold and rest   Progression Same, inconsistent      Edema (Circumference measured in cm)   9/19/2022 9/19/2022 10/26/22     Finger  R L L   P1 IF 7.3 7.2 7.0   P1 MF  7.1 6.1 (Mistake? Possible 7.1)  6.8 Finger is visibly smaller.      Sensation   WNL throughout all nerve distributions; per patient report    ROM  Hand 9/19/2022 9/19/2022  10/26/22     AROM(PROM) R L  L   Index MP 0/90 0/90  91   PIP 0/95 0/65+  -5/84   DIP 0/90 0/60+  85   BENTON 275 215  255   Long MP 0/90 0/90  90   PIP 0/95 0/100+  103   DIP 0/95 0/70+  81   [place] " 280 260  274     Strength   (Measured in pounds)  Pain Report: - none  + mild    ++ moderate    +++ severe    9/19/2022 9/19/2022 10/26/22     Trials R L L   1  2  3 100 80 60   Average        Lat Pinch 9/19/2022 9/19/2022 10/26/22     Trials R L L   1  2  3 19.5 18 11+   Average        3 Pt Pinch 9/19/2022 9/19/2022 10/26/22     Trials R L L   1  2  3 21.5 16.5+ 12.5   Averagez        Palpation    IF MCP +  2nd DI +     10/26/22  No  Pain with palpation of same sites     Other  Denies pain with composite stretch of wrist and digits with extended elbow.     Assessment:  Response to therapy has been improvement to:  ROM   Strength  Edema  Pain  Work Performance  Self Care Skills  Coordination    Overall Assessment:  Patient is becoming more independent in home exercise program  Patient would benefit from continued therapy to achieve rehab potential  STG/LTG:  STGoals have been reviewed and progress or achievement has occurred;  see goal sheet for details and updates.    Plan:  Frequency/Duration:  Recommend continuing to see patient  1 X week, once daily  for 6 more weeks  Appropriateness of Rx I have re-evaluated this patient and find that the nature, scope, duration and intensity of the therapy is appropriate for the medical condition of the patient.  Recommendations for Continued Therapy    Treatment Plan:  Modalities:    US, Iontophoresis, Fluidotherapy and Paraffin   Therapeutic Exercise:    AROM, AAROM, PROM, Tendon Gliding, Blocking, Reverse Blocking, Extensor Tracking, Isotonics, Isometrics and Stabilization  Therapeutic Activities:   Functional activities   Neuromuscular re-ed:   Nerve Gliding, Coordination/Dexterity, Posture, Kinesiotaping, Isometrics and Stabilization  Manual Techniques:   Coordination/Dexterity, Joint mobilization, Friction massage, Myofascial release and Manual edema mobilization  Orthotic Fabrication:    Static, Static progressive and Dynamic  Self Care:    Self Care Tasks,  Ergonomic Considerations and Work Tasks    Home Exercise Program:  Radial gutter for night support  RMO for day support - recommending until 9/11/22 (6 weeks of full time wear) then reassess   [facility]  The [facility] to [facility].  Education Sheet General  No Notes  Orthosis Wear and Care  No Notes  Finger Active Range of Motion Tendon Glides Fist Series  No Notes  Finger Stability  No Notes  Icing  No Notes      Next Visit:  Assess taping   STM  Assess HEP  Assess progress with RMO wear

## 2022-11-05 ENCOUNTER — IMMUNIZATION (OUTPATIENT)
Dept: PEDIATRICS | Facility: CLINIC | Age: 52
End: 2022-11-05
Payer: COMMERCIAL

## 2022-11-05 DIAGNOSIS — Z23 NEED FOR PROPHYLACTIC VACCINATION AND INOCULATION AGAINST INFLUENZA: Primary | ICD-10-CM

## 2022-11-05 PROCEDURE — 99207 PR NO CHARGE NURSE ONLY: CPT

## 2022-11-05 PROCEDURE — 90471 IMMUNIZATION ADMIN: CPT

## 2022-11-05 PROCEDURE — 90682 RIV4 VACC RECOMBINANT DNA IM: CPT

## 2022-11-09 ENCOUNTER — THERAPY VISIT (OUTPATIENT)
Dept: OCCUPATIONAL THERAPY | Facility: CLINIC | Age: 52
End: 2022-11-09
Payer: COMMERCIAL

## 2022-11-09 DIAGNOSIS — M79.645 PAIN OF FINGER OF LEFT HAND: Primary | ICD-10-CM

## 2022-11-09 PROCEDURE — 97110 THERAPEUTIC EXERCISES: CPT | Mod: GO | Performed by: OCCUPATIONAL THERAPIST

## 2022-11-09 PROCEDURE — 97140 MANUAL THERAPY 1/> REGIONS: CPT | Mod: GO | Performed by: OCCUPATIONAL THERAPIST

## 2022-12-08 DIAGNOSIS — S49.91XA INJURY OF RIGHT ACROMIOCLAVICULAR JOINT, INITIAL ENCOUNTER: ICD-10-CM

## 2022-12-08 RX ORDER — DICLOFENAC SODIUM 75 MG/1
75 TABLET, DELAYED RELEASE ORAL 2 TIMES DAILY
Qty: 40 TABLET | Refills: 0 | Status: SHIPPED | OUTPATIENT
Start: 2022-12-08 | End: 2023-05-11

## 2022-12-16 ENCOUNTER — THERAPY VISIT (OUTPATIENT)
Dept: OCCUPATIONAL THERAPY | Facility: CLINIC | Age: 52
End: 2022-12-16
Payer: COMMERCIAL

## 2022-12-16 DIAGNOSIS — M79.645 PAIN OF FINGER OF LEFT HAND: Primary | ICD-10-CM

## 2022-12-16 PROCEDURE — 97140 MANUAL THERAPY 1/> REGIONS: CPT | Mod: GO | Performed by: OCCUPATIONAL THERAPIST

## 2022-12-16 PROCEDURE — 97112 NEUROMUSCULAR REEDUCATION: CPT | Mod: GO | Performed by: OCCUPATIONAL THERAPIST

## 2023-01-05 ENCOUNTER — THERAPY VISIT (OUTPATIENT)
Dept: OCCUPATIONAL THERAPY | Facility: CLINIC | Age: 53
End: 2023-01-05
Payer: COMMERCIAL

## 2023-01-05 DIAGNOSIS — M79.645 PAIN OF FINGER OF LEFT HAND: Primary | ICD-10-CM

## 2023-01-05 PROCEDURE — 97110 THERAPEUTIC EXERCISES: CPT | Mod: GO | Performed by: OCCUPATIONAL THERAPIST

## 2023-01-05 PROCEDURE — 97140 MANUAL THERAPY 1/> REGIONS: CPT | Mod: GO | Performed by: OCCUPATIONAL THERAPIST

## 2023-01-05 NOTE — PROGRESS NOTES
"Hand Therapy Progress Note    Current Date:  1/05/2023    Diagnosis: Metacarpophalangeal joint pain of left hand, extensor tendonitis of 2nd digit across MCP joint  DOI: 9/8/22, ~3 mo onset   DOS: NA  Procedure:  NA  Visits 7  Precautions: None    Reporting period is 0/26/2022 to 1/5/2022    Subjective:   Pt has not returned to guitar playing, but  Has improved functionally in , pinch andrange.    Functional changes noted by patient:  Improvement in Self Care Tasks (bathing), Work Tasks, Recreational Activities and Household Chores  Patient has noted adverse reaction to:  None    Functional Outcome Measure:  Upper Extremity Functional Index Score:  SCORE:   Column Totals: /80: 73 improved to 76/100 1/5/2022   (A lower score indicates greater disability.)    Objective:    Please refer to the daily flowsheet for treatment provided today.   Pain Level (Scale 0-10)   9/19/2022 10/26/22   1/5/2023   At Rest 0 0 0   With Use 3-4  2 2     Pain Description  Date 9/19/2022   Location Dorsal IF MCP and some noted in distal interossei space between IF and MF    Pain Quality Aching, Dull, Nagging and Tender   Frequency intermittent     Pain is worst  daytime   Exacerbated by  Guitar, gripping, pressure, twisting, holding controller with neo - does not notice as much with \"normal life\".    Relieved by cold and rest   Progression Same, inconsistent      Edema (Circumference measured in cm)   9/19/2022 9/19/2022 10/26/22     Finger  R L L   P1 IF 7.3 7.2 7.0   P1 MF  7.1 6.1 (Mistake? Possible 7.1)  6.8 Finger is visibly smaller.      Sensation   WNL throughout all nerve distributions; per patient report    ROM  Hand 9/19/2022 9/19/2022 1/5/2023 10/26/22     AROM(PROM) R L L L   Index MP 0/90 0/90 /090 91   PIP 0/95 0/65+ 0/95 -5/84   DIP 0/90 0/60+ 0/85 85   [place] 275 215  255   Long MP 0/90 0/90 WNL 90   PIP 0/95 0/100+    DIP 0/95 0/70+ WNL 81   [place] 280 260  274     Strength   (Measured in pounds)  Pain Report: - " none  + mild    ++ moderate    +++ severe    9/19/2022 9/19/2022 10/26/22   1/5/2023   Trials R L L L   1  2  3 100 80 60 82   Average         Lat Pinch 9/19/2022 9/19/2022 10/26/22   1/5/2023   Trials R L L L   1  2  3 19.5 18 11+ 20   Average         3 Pt Pinch 9/19/2022 9/19/2022 10/26/22   1/5/2023   Trials R L L L   1  2  3 21.5 16.5+ 12.5 20   Averagez         Palpation    IF MCP +  2nd DI +     10/26/22  No  Pain with palpation of same sites     Other  Denies pain with composite stretch of wrist and digits with extended elbow.     Assessment:  Response to therapy has been improvement to:  ROM and Strength  Edema  Pain  Work Performance  Self Care Skills  Coordination    Overall Assessment:  Patient is becoming more independent in home exercise program  Patient would benefit from continued therapy to achieve rehab potential  STG/LTG:  STGoals have been reviewed and progress or achievement has occurred;  see goal sheet for details and updates.    Plan:  Frequency/Duration:  Recommend continuing to see patient  1 X week, once daily  for 6 more weeks  Appropriateness of Rx I have re-evaluated this patient and find that the nature, scope, duration and intensity of the therapy is appropriate for the medical condition of the patient.  Recommendations for Continued Therapy    Treatment Plan:  Modalities:    US, Iontophoresis, Fluidotherapy and Paraffin   Therapeutic Exercise:    AROM, AAROM, PROM, Tendon Gliding, Blocking, Reverse Blocking, Extensor Tracking, Isotonics, Isometrics and Stabilization  Therapeutic Activities:   Functional activities   Neuromuscular re-ed:   Nerve Gliding, Coordination/Dexterity, Posture, Kinesiotaping, Isometrics and Stabilization  Manual Techniques:   Coordination/Dexterity, Joint mobilization, Friction massage, Myofascial release and Manual edema mobilization  Orthotic Fabrication:    Static, Static progressive and Dynamic  Self Care:    Self Care Tasks, Ergonomic Considerations  and Work Tasks    Home Exercise Program:  K tape intrinsic support PRN  Philip tape PRN  Continue home stability program/tendon glides     Next Visit:  Follow with the DrRebekah On 1/18/2023, Will continue Home Program. Plan to discharge on home program, Keep chart open 3 mo. as if symptoms do not continue to resolve and physician recommends pt. May return for further treatment

## 2023-01-18 ENCOUNTER — OFFICE VISIT (OUTPATIENT)
Dept: ORTHOPEDICS | Facility: CLINIC | Age: 53
End: 2023-01-18
Payer: COMMERCIAL

## 2023-01-18 DIAGNOSIS — M79.642 LEFT HAND PAIN: Primary | ICD-10-CM

## 2023-01-18 PROCEDURE — 99214 OFFICE O/P EST MOD 30 MIN: CPT | Performed by: FAMILY MEDICINE

## 2023-01-18 NOTE — PROGRESS NOTES
S:  F/u L hand second metacarpal /extensor tendinitis injury    Diagnosis: Metacarpophalangeal joint pain of left hand, extensor tendonitis of 2nd digit across MCP joint  DOI: 9/8/22    -Complaint with Hand Therapy and that has helped some for sure.   -Still struggling to return to playing guitar.  He tried by holding up his 3rd finger while playing and that increased his symptoms and also he developed some pain over his 3rd finger extensor tendon.   -Has used diclofenac courses to help with symptoms.       No change in PMH.     Current Outpatient Medications   Medication     diclofenac (VOLTAREN) 75 MG EC tablet     diclofenac (VOLTAREN) 75 MG EC tablet     multivitamin, therapeutic (THERA-VIT) TABS tablet     Current Facility-Administered Medications   Medication     lidocaine (PF) (XYLOCAINE) 1 % injection 2 mL     triamcinolone (KENALOG-40) injection 40 mg     O:  NAD  There were no vitals taken for this visit.  L hand:  Mild ttp over the 2nd and 3rd extensor tendons with mild swelling of the 2nd extensor tendon.   No synovitis noted in the MCP joints  Discomfort with resisted finger extension in the 2nd finger but no pain with flexion ROM of the fingers.   No deformity  No redness    A:  LEFT 2nd and 3rd extensor tendinopathy with persisting symptoms with improvement but unable to return to guitar playing.       P:  We have reviewed his course and feel that it would be appropriate to proceed with a MSK US guided 2nd extensor tendon sheath CSI.  He understands the risks of hypopigmentation and fat atrophy and accepts that.  Will is scheduled for early Feb for the injection.     Latrice Gimenez, visiting resident also saw and examined the patient.     Lizeth Bates MD, CAQ, FACSM, CCD  [facility]  Sports Medicine and Bone Health  Team Physician;  Athletics

## 2023-01-18 NOTE — LETTER
1/18/2023      RE: Vasile Stallworth  [address] [address]     Dear Colleague,    Thank you for referring your patient, Vasile Stallworth, to the [facility] SPORTS MEDICINE CLINIC [place]. Please see a copy of my visit note below.    S:  F/u L hand second metacarpal /extensor tendinitis injury    Diagnosis: Metacarpophalangeal joint pain of left hand, extensor tendonitis of 2nd digit across MCP joint  DOI: 9/8/22    -Complaint with Hand Therapy and that has helped some for sure.   -Still struggling to return to playing guitar.  He tried by holding up his 3rd finger while playing and that increased his symptoms and also he developed some pain over his 3rd finger extensor tendon.   -Has used diclofenac courses to help with symptoms.       No change in PMH.     Current Outpatient Medications   Medication     diclofenac (VOLTAREN) 75 MG EC tablet     diclofenac (VOLTAREN) 75 MG EC tablet     multivitamin, therapeutic (THERA-VIT) TABS tablet     Current Facility-Administered Medications   Medication     lidocaine (PF) (XYLOCAINE) 1 % injection 2 mL     triamcinolone (KENALOG-40) injection 40 mg     O:  NAD  There were no vitals taken for this visit.  L hand:  Mild ttp over the 2nd and 3rd extensor tendons with mild swelling of the 2nd extensor tendon.   No synovitis noted in the MCP joints  Discomfort with resisted finger extension in the 2nd finger but no pain with flexion ROM of the fingers.   No deformity  No redness    A:  LEFT 2nd and 3rd extensor tendinopathy with persisting symptoms with improvement but unable to return to guitar playing.       P:  We have reviewed his course and feel that it would be appropriate to proceed with a MSK US guided 2nd extensor tendon sheath CSI.  He understands the risks of hypopigmentation and fat atrophy and accepts that.  Will is scheduled for early Feb for the injection.     Latrice Gimenez, visiting resident also saw and examined the patient.     Lizeth ROY  MD Paulo, CAQ, FACSM, CCD  [facility]  Sports Medicine and Bone Health  Team Physician;  Athletics

## 2023-02-02 ENCOUNTER — OFFICE VISIT (OUTPATIENT)
Dept: ORTHOPEDICS | Facility: CLINIC | Age: 53
End: 2023-02-02
Payer: COMMERCIAL

## 2023-02-02 DIAGNOSIS — M79.642 LEFT HAND PAIN: Primary | ICD-10-CM

## 2023-02-02 DIAGNOSIS — M65.949 EXTENSOR TENOSYNOVITIS OF FINGER: ICD-10-CM

## 2023-02-02 PROCEDURE — 99213 OFFICE O/P EST LOW 20 MIN: CPT | Mod: 25 | Performed by: FAMILY MEDICINE

## 2023-02-02 PROCEDURE — 20550 NJX 1 TENDON SHEATH/LIGAMENT: CPT | Mod: LT | Performed by: FAMILY MEDICINE

## 2023-02-02 PROCEDURE — 76942 ECHO GUIDE FOR BIOPSY: CPT | Mod: LT | Performed by: FAMILY MEDICINE

## 2023-02-02 RX ADMIN — LIDOCAINE HYDROCHLORIDE 1 ML: 10 INJECTION, SOLUTION EPIDURAL; INFILTRATION; INTRACAUDAL; PERINEURAL at 16:12

## 2023-02-02 RX ADMIN — TRIAMCINOLONE ACETONIDE 40 MG: 40 INJECTION, SUSPENSION INTRA-ARTICULAR; INTRAMUSCULAR at 16:12

## 2023-02-02 NOTE — PROGRESS NOTES
[facility]  Sports Medicine Clinic  Clinics and Surgery Center         SUBJECTIVE       Vasile Stallworth is a 52 year old male presenting to clinic today with:    Left index extensor tendonitis   - DOI 9/8/22  - hand therapy has helped but plateaud  - still cant play guitar which is affecting his quality of life   - here for extensor tendon USG CSI  - no change since last visit      PMH, Medications and Allergies were reviewed and updated as needed.    ROS:  As noted above otherwise negative.    Patient Active Problem List   Diagnosis     Pain of finger of left hand       Current Outpatient Medications   Medication Sig Dispense Refill     diclofenac (VOLTAREN) 75 MG EC tablet Take 1 tablet (75 mg) by mouth 2 times daily 40 tablet 0     multivitamin, therapeutic (THERA-VIT) TABS tablet Take 1 tablet by mouth daily       diclofenac (VOLTAREN) 75 MG EC tablet Take 1 tablet (75 mg) by mouth 2 times daily for 14 days 28 tablet 0            OBJECTIVE:       Vitals: There were no vitals filed for this visit.  BMI: There is no height or weight on file to calculate BMI.    Gen:  Well nourished and in no acute distress  HEENT: Extraocular movement intact  Neck: Supple  Pulm:  Breathing Comfortably. No increased respiratory effort.  Psych: Euthymic. Appropriately answers questions    MSK:   L hand:  Mild ttp over the 2nd and 3rd extensor tendons with mild swelling of the 2nd extensor tendon.   No synovitis noted in the MCP joints  Discomfort with resisted finger extension in the 2nd finger but no pain with flexion ROM of the fingers.   No deformity  No redness      Procedure: USG INDEX FINGER LEFT HAND EXTENSOR TENDON CSI    After risks, benefits and complications of steroid injection were discussed with the patient he elected to proceed.  Using sterile technique, the area was first prepped with an chloraprep.    A 25 gauge, 1/2 inch needle was used to inject a solution of 20 mg kenalong and 1cc of 1% lidocaine in  the area of the L index extensor tendon sheath at the MTP joint.  Pt.  tolerated the procedure well without complications.  p injection instructions given.         ASSESSMENT and PLAN:     Vasile was seen today for follow up.    Diagnoses and all orders for this visit:    Left hand pain    Extensor tenosynovitis of finger  -     POC US GUIDANCE NEEDLE PLACEMENT; Future  -     Hand / Upper Extremity Injection/Arthrocentesis    Other orders  -     Cancel: Small Joint Injection/Arthrocentesis      52 yoM with extensor tenosynovitis of the index finger who has hit plateau in hand therapy and OTC pain relief measures. USG CSI done today. US required in order to appropriately visualize tendon sheath and avoid injecting into tendon itself, vasculature, or nerves. See proc note. Tolerated well. Follow up if not improving in 3-4 weeks.     Options for treatment and/or follow-up care were reviewed with the patient was actively involved in the decision making process. Patient verbalized understanding and was in agreement with the plan.    The patient was seen by and discussed with attending physician Dr.Suzanne KIRILL Bates MD, CAQ, CCD, who agrees with the plan unless otherwise stated.     Radha Hart DO  Primary Care Sports Medicine Fellow  [facility]    Hand / Upper Extremity Injection/Arthrocentesis    Date/Time: 2/2/2023 4:12 PM  Performed by: Radha Hart MD  Authorized by: Lizeth Bates MD     Indications:  Pain  Needle Size:  25 G  Guidance: ultrasound    Approach:  Radial   Condition comment:  ECU tendon     Medications:  40 mg triamcinolone 40 MG/ML; 1 mL lidocaine (PF) 1 %  Outcome:  Tolerated well, no immediate complications  Procedure discussed: discussed risks, benefits, and alternatives    Consent Given by:  Patient  Timeout: timeout called immediately prior to procedure    Prep: patient was prepped and draped in usual sterile fashion

## 2023-02-02 NOTE — NURSING NOTE
[address] [address]  Dept: [phone]  ______________________________________________________________________________    Patient: Vasile Stallworth   : 1970   MRN: [id number]   2023    INVASIVE PROCEDURE SAFETY CHECKLIST    Date: 2023   Procedure:ECU tendon injection   Patient Name: Vasile Stallworth  MRN: [id number]  YOB: 1970    Action: Complete sections as appropriate. Any discrepancy results in a HARD COPY until resolved.     PRE PROCEDURE:  Patient ID verified with 2 identifiers (name and  or MRN): Yes  Procedure and site verified with patient/designee (when able): Yes  Accurate consent documentation in medical record: Yes  H&P (or appropriate assessment) documented in medical record: NA  H&P must be up to 20 days prior to procedure and updates within 24 hours of procedure as applicable: Yes  Relevant diagnostic and radiology test results appropriately labeled and displayed as applicable: NA  Procedure site(s) marked with provider initials: Yes    TIMEOUT:  Time-Out performed immediately prior to starting procedure, including verbal and active participation of all team members addressing the following:Yes  * Correct patient identify  * Confirmed that the correct side and site are marked  * An accurate procedure consent form  * Agreement on the procedure to be done  * Correct patient position  * Relevant images and results are properly labeled and appropriately displayed  * The need to administer antibiotics or fluids for irrigation purposes during the procedure as applicable   * Safety precautions based on patient history or medication use    DURING PROCEDURE: Verification of correct person, site, and procedures any time the responsibility for care of the patient is transferred to another member of the care team.       Prior to injection, verified patient identity using patient's name and date of  birth.  Due to injection administration, patient instructed to remain in clinic for 15 minutes  afterwards, and to report any adverse reaction to me immediately.    Tendon sheath injection was performed.     Drug Amount Wasted:  Yes: 4 mg/ml   Vial/Syringe: Single dose vial  Expiration Date:  09/26      Monisha Perez [facility]  February 2, 2023

## 2023-02-02 NOTE — LETTER
2/2/2023      RE: Vasile Stallworth  [address] [address]     Dear Colleague,    Thank you for referring your patient, Vasile Stallworth, to the [facility] SPORTS MEDICINE CLINIC [place]. Please see a copy of my visit note below.      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    ROS:  As noted above otherwise negative.    Patient Active Problem List   Diagnosis     Pain of finger of left hand       Current Outpatient Medications   Medication Sig Dispense Refill     diclofenac (VOLTAREN) 75 MG EC tablet Take 1 tablet (75 mg) by mouth 2 times daily 40 tablet 0     multivitamin, therapeutic (THERA-VIT) TABS tablet Take 1 tablet by mouth daily       diclofenac (VOLTAREN) 75 MG EC tablet Take 1 tablet (75 mg) by mouth 2 times daily for 14 days 28 tablet 0            OBJECTIVE:       Vitals: There were no vitals filed for this visit.  BMI: There is no height or weight on file to calculate BMI.    Gen:  Well nourished and in no acute distress  HEENT: Extraocular movement intact  Neck: Supple  Pulm:  Breathing Comfortably. No increased respiratory effort.  Psych: Euthymic. Appropriately answers questions    MSK:   L hand:  Mild ttp over the 2nd and 3rd extensor tendons with mild swelling of the 2nd extensor tendon.   No synovitis noted in the MCP joints  Discomfort with resisted finger extension in the 2nd finger but no pain with flexion ROM of the fingers.   No deformity  No redness      Procedure: USG INDEX FINGER LEFT HAND EXTENSOR TENDON CSI    After risks, benefits and complications of  steroid injection were discussed with the patient he elected to proceed.  Using sterile technique, the area was first prepped with an chloraprep.    A 25 gauge, 1/2 inch needle was used to inject a solution of 20 mg kenalong and 1cc of 1% lidocaine in the area of the L index extensor tendon sheath at the MTP joint.  Pt.  tolerated the procedure well without complications.  p injection instructions given.         ASSESSMENT and PLAN:     Vasile was seen today for follow up.    Diagnoses and all orders for this visit:    Left hand pain    Extensor tenosynovitis of finger  -     POC US GUIDANCE NEEDLE PLACEMENT; Future  -     Hand / Upper Extremity Injection/Arthrocentesis    Other orders  -     Cancel: Small Joint Injection/Arthrocentesis      52 yoM with extensor tenosynovitis of the index finger who has hit plateau in hand therapy and OTC pain relief measures. USG CSI done today. See proc note. Tolerated well. Follow up if not improving in 3-4 weeks.     Options for treatment and/or follow-up care were reviewed with the patient was actively involved in the decision making process. Patient verbalized understanding and was in agreement with the plan.    The patient was seen by and discussed with attending physician Dr.Suzanne KIRILL Bates MD, CAQ, CCD, who agrees with the plan unless otherwise stated.     Radha Hart DO  Primary Care Sports Medicine Fellow  [facility]    Hand / Upper Extremity Injection/Arthrocentesis    Date/Time: 2/2/2023 4:12 PM  Performed by: Radha Hart MD  Authorized by: Lizeth Bates MD     Indications:  Pain  Needle Size:  25 G  Guidance: ultrasound    Approach:  Radial   Condition comment:  ECU tendon     Medications:  40 mg triamcinolone 40 MG/ML; 1 mL lidocaine (PF) 1 %  Outcome:  Tolerated well, no immediate complications  Procedure discussed: discussed risks, benefits, and alternatives    Consent Given by:  Patient  Timeout: timeout called immediately  prior to procedure    Prep: patient was prepped and draped in usual sterile fashion              Attending Note:   I have directly supervised the MSK US guided injection.   Lizeth Bates MD, CAQ, CCD  [facility]  Sports Medicine and Bone Health

## 2023-02-05 RX ORDER — LIDOCAINE HYDROCHLORIDE 10 MG/ML
1 INJECTION, SOLUTION EPIDURAL; INFILTRATION; INTRACAUDAL; PERINEURAL
Status: SHIPPED | OUTPATIENT
Start: 2023-02-02

## 2023-02-05 RX ORDER — TRIAMCINOLONE ACETONIDE 40 MG/ML
40 INJECTION, SUSPENSION INTRA-ARTICULAR; INTRAMUSCULAR
Status: SHIPPED | OUTPATIENT
Start: 2023-02-02

## 2023-02-06 NOTE — PROGRESS NOTES
Attending Note:   I have directly supervised the MSK US guided injection.   Lizeth Bates MD, CAQ, CCD  [facility]  Sports Medicine and Bone Health

## 2023-02-28 PROBLEM — M79.645 PAIN OF FINGER OF LEFT HAND: Status: RESOLVED | Noted: 2022-09-19 | Resolved: 2023-02-28

## 2023-05-10 SDOH — ECONOMIC STABILITY: TRANSPORTATION INSECURITY
IN THE PAST 12 MONTHS, HAS LACK OF TRANSPORTATION KEPT YOU FROM MEETINGS, WORK, OR FROM GETTING THINGS NEEDED FOR DAILY LIVING?: NO

## 2023-05-10 SDOH — ECONOMIC STABILITY: INCOME INSECURITY: HOW HARD IS IT FOR YOU TO PAY FOR THE VERY BASICS LIKE FOOD, HOUSING, MEDICAL CARE, AND HEATING?: NOT HARD AT ALL

## 2023-05-10 SDOH — ECONOMIC STABILITY: INCOME INSECURITY: IN THE LAST 12 MONTHS, WAS THERE A TIME WHEN YOU WERE NOT ABLE TO PAY THE MORTGAGE OR RENT ON TIME?: NO

## 2023-05-10 SDOH — ECONOMIC STABILITY: FOOD INSECURITY: WITHIN THE PAST 12 MONTHS, THE FOOD YOU BOUGHT JUST DIDN'T LAST AND YOU DIDN'T HAVE MONEY TO GET MORE.: NEVER TRUE

## 2023-05-10 SDOH — ECONOMIC STABILITY: FOOD INSECURITY: WITHIN THE PAST 12 MONTHS, YOU WORRIED THAT YOUR FOOD WOULD RUN OUT BEFORE YOU GOT MONEY TO BUY MORE.: NEVER TRUE

## 2023-05-10 SDOH — HEALTH STABILITY: PHYSICAL HEALTH: ON AVERAGE, HOW MANY MINUTES DO YOU ENGAGE IN EXERCISE AT THIS LEVEL?: 30 MIN

## 2023-05-10 SDOH — HEALTH STABILITY: PHYSICAL HEALTH: ON AVERAGE, HOW MANY DAYS PER WEEK DO YOU ENGAGE IN MODERATE TO STRENUOUS EXERCISE (LIKE A BRISK WALK)?: 3 DAYS

## 2023-05-10 SDOH — ECONOMIC STABILITY: TRANSPORTATION INSECURITY
IN THE PAST 12 MONTHS, HAS THE LACK OF TRANSPORTATION KEPT YOU FROM MEDICAL APPOINTMENTS OR FROM GETTING MEDICATIONS?: NO

## 2023-05-10 ASSESSMENT — ENCOUNTER SYMPTOMS
HEMATURIA: 0
ABDOMINAL PAIN: 0
CHILLS: 0
NERVOUS/ANXIOUS: 0
DIZZINESS: 0
CONSTIPATION: 0
HEADACHES: 0
SHORTNESS OF BREATH: 0
PARESTHESIAS: 0
HEARTBURN: 0
COUGH: 0
WEAKNESS: 0
JOINT SWELLING: 0
FEVER: 0
FREQUENCY: 0
SORE THROAT: 0
HEMATOCHEZIA: 0
EYE PAIN: 0
DYSURIA: 0
MYALGIAS: 0
NAUSEA: 0
DIARRHEA: 0
ARTHRALGIAS: 1
PALPITATIONS: 0

## 2023-05-10 ASSESSMENT — LIFESTYLE VARIABLES
AUDIT-C TOTAL SCORE: 4
HOW MANY STANDARD DRINKS CONTAINING ALCOHOL DO YOU HAVE ON A TYPICAL DAY: 1 OR 2
HOW OFTEN DO YOU HAVE A DRINK CONTAINING ALCOHOL: 4 OR MORE TIMES A WEEK
HOW OFTEN DO YOU HAVE SIX OR MORE DRINKS ON ONE OCCASION: NEVER
SKIP TO QUESTIONS 9-10: 1

## 2023-05-10 ASSESSMENT — SOCIAL DETERMINANTS OF HEALTH (SDOH)
HOW OFTEN DO YOU GET TOGETHER WITH FRIENDS OR RELATIVES?: TWICE A WEEK
IN A TYPICAL WEEK, HOW MANY TIMES DO YOU TALK ON THE PHONE WITH FAMILY, FRIENDS, OR NEIGHBORS?: ONCE A WEEK
DO YOU BELONG TO ANY CLUBS OR ORGANIZATIONS SUCH AS CHURCH GROUPS UNIONS, FRATERNAL OR ATHLETIC GROUPS, OR SCHOOL GROUPS?: NO
HOW OFTEN DO YOU ATTEND CHURCH OR RELIGIOUS SERVICES?: NEVER

## 2023-05-11 ENCOUNTER — OFFICE VISIT (OUTPATIENT)
Dept: PEDIATRICS | Facility: CLINIC | Age: 53
End: 2023-05-11
Payer: COMMERCIAL

## 2023-05-11 VITALS
RESPIRATION RATE: 16 BRPM | WEIGHT: 223.6 LBS | TEMPERATURE: 96.6 F | HEIGHT: 74 IN | HEART RATE: 73 BPM | OXYGEN SATURATION: 98 % | DIASTOLIC BLOOD PRESSURE: 72 MMHG | BODY MASS INDEX: 28.7 KG/M2 | SYSTOLIC BLOOD PRESSURE: 112 MMHG

## 2023-05-11 DIAGNOSIS — Z11.4 SCREENING FOR HIV (HUMAN IMMUNODEFICIENCY VIRUS): ICD-10-CM

## 2023-05-11 DIAGNOSIS — Z11.59 NEED FOR HEPATITIS C SCREENING TEST: ICD-10-CM

## 2023-05-11 DIAGNOSIS — E66.3 OVERWEIGHT (BMI 25.0-29.9): ICD-10-CM

## 2023-05-11 DIAGNOSIS — Z13.220 LIPID SCREENING: ICD-10-CM

## 2023-05-11 DIAGNOSIS — Z00.00 ROUTINE GENERAL MEDICAL EXAMINATION AT A HEALTH CARE FACILITY: Primary | ICD-10-CM

## 2023-05-11 DIAGNOSIS — H61.23 BILATERAL IMPACTED CERUMEN: ICD-10-CM

## 2023-05-11 LAB
CHOLEST SERPL-MCNC: 163 MG/DL
HBA1C MFR BLD: 6 % (ref 0–5.6)
HDLC SERPL-MCNC: 50 MG/DL
LDLC SERPL CALC-MCNC: 96 MG/DL
NONHDLC SERPL-MCNC: 113 MG/DL
TRIGL SERPL-MCNC: 84 MG/DL

## 2023-05-11 PROCEDURE — 83036 HEMOGLOBIN GLYCOSYLATED A1C: CPT | Performed by: PHYSICIAN ASSISTANT

## 2023-05-11 PROCEDURE — 36415 COLL VENOUS BLD VENIPUNCTURE: CPT | Performed by: PHYSICIAN ASSISTANT

## 2023-05-11 PROCEDURE — 90750 HZV VACC RECOMBINANT IM: CPT | Performed by: PHYSICIAN ASSISTANT

## 2023-05-11 PROCEDURE — 90471 IMMUNIZATION ADMIN: CPT | Performed by: PHYSICIAN ASSISTANT

## 2023-05-11 PROCEDURE — 87389 HIV-1 AG W/HIV-1&-2 AB AG IA: CPT | Performed by: PHYSICIAN ASSISTANT

## 2023-05-11 PROCEDURE — 69209 REMOVE IMPACTED EAR WAX UNI: CPT | Performed by: PHYSICIAN ASSISTANT

## 2023-05-11 PROCEDURE — 99396 PREV VISIT EST AGE 40-64: CPT | Mod: 25 | Performed by: PHYSICIAN ASSISTANT

## 2023-05-11 PROCEDURE — 86803 HEPATITIS C AB TEST: CPT | Performed by: PHYSICIAN ASSISTANT

## 2023-05-11 PROCEDURE — 80061 LIPID PANEL: CPT | Performed by: PHYSICIAN ASSISTANT

## 2023-05-11 ASSESSMENT — ENCOUNTER SYMPTOMS
PALPITATIONS: 0
PARESTHESIAS: 0
DIZZINESS: 0
HEMATOCHEZIA: 0
SORE THROAT: 0
HEMATURIA: 0
ABDOMINAL PAIN: 0
NAUSEA: 0
EYE PAIN: 0
FEVER: 0
CHILLS: 0
NERVOUS/ANXIOUS: 0
HEARTBURN: 0
COUGH: 0
FREQUENCY: 0
HEADACHES: 0
DIARRHEA: 0
WEAKNESS: 0
DYSURIA: 0
SHORTNESS OF BREATH: 0
CONSTIPATION: 0
JOINT SWELLING: 0
ARTHRALGIAS: 1
MYALGIAS: 0

## 2023-05-11 ASSESSMENT — PAIN SCALES - GENERAL: PAINLEVEL: MILD PAIN (2)

## 2023-05-11 NOTE — PROGRESS NOTES
SUBJECTIVE:   CC: Jose is an 53 year old who presents for preventative health visit.       5/11/2023     8:47 AM   Additional Questions   Roomed by Chante   Accompanied by Self         5/11/2023     8:47 AM   Patient Reported Additional Medications   Patient reports taking the following new medications no     Patient has been advised of split billing requirements and indicates understanding: Yes  Healthy Habits:     Getting at least 3 servings of Calcium per day:  Yes    Bi-annual eye exam:  NO    Dental care twice a year:  NO    Sleep apnea or symptoms of sleep apnea:  None    Diet:  Regular (no restrictions)    Frequency of exercise:  2-3 days/week    Duration of exercise:  30-45 minutes    Taking medications regularly:  Not Applicable    Barriers to taking medications:  Not applicable    Medication side effects:  Not applicable    PHQ-2 Total Score: 0    Additional concerns today:  No              Today's PHQ-2 Score:       5/10/2023     2:21 PM   PHQ-2 ( 1999 [employer])   Q1: Little interest or pleasure in doing things 0   Q2: Feeling down, depressed or hopeless 0   PHQ-2 Score 0   Q1: Little interest or pleasure in doing things Not at all   Q2: Feeling down, depressed or hopeless Not at all   PHQ-2 Score 0           Social History     Tobacco Use     Smoking status: Never     Smokeless tobacco: Never   Vaping Use     Vaping status: Never Used   Substance Use Topics     Alcohol use: Yes     Comment: 10-14 drinks a week             5/10/2023     2:21 PM   Alcohol Use   Prescreen: >3 drinks/day or >7 drinks/week? Yes       Last PSA: No results found for: PSA    Reviewed orders with patient. Reviewed health maintenance and updated orders accordingly - Yes  Lab work is in process    Reviewed and updated as needed this visit by clinical staff   Tobacco  Allergies  Meds    Surg Hx  Fam Hx          Reviewed and updated as needed this visit by Provider   Tobacco   Meds    Surg Hx  Fam Hx             Review of  "Systems   Constitutional: Negative for chills and fever.   HENT: Positive for hearing loss. Negative for congestion, ear pain and sore throat.    Eyes: Negative for pain and visual disturbance.   Respiratory: Negative for cough and shortness of breath.    Cardiovascular: Negative for chest pain, palpitations and peripheral edema.   Gastrointestinal: Negative for abdominal pain, constipation, diarrhea, heartburn, hematochezia and nausea.   Genitourinary: Negative for dysuria, frequency, genital sores, hematuria, impotence, penile discharge and urgency.   Musculoskeletal: Positive for arthralgias. Negative for joint swelling and myalgias.   Skin: Negative for rash.   Neurological: Negative for dizziness, weakness, headaches and paresthesias.   Psychiatric/Behavioral: Negative for mood changes. The patient is not nervous/anxious.          OBJECTIVE:   /72 (BP Location: Right arm, Patient Position: Chair, Cuff Size: Adult Regular)   Pulse 73   Temp (!) 96.6  F (35.9  C) (Tympanic)   Resp 16   Ht 1.867 m (6' 1.5\")   Wt 101.4 kg (223 lb 9.6 oz)   SpO2 98%   BMI 29.10 kg/m      Physical Exam  GENERAL: healthy, alert and no distress  EYES: Eyes grossly normal to inspection, PERRL and conjunctivae and sclerae normal  HENT: normal cephalic/atraumatic, both ears: crumen impaction, nose and mouth without ulcers or lesions, oropharynx clear and oral mucous membranes moist  NECK: no adenopathy, no asymmetry, masses, or scars and thyroid normal to palpation  RESP: lungs clear to auscultation - no rales, rhonchi or wheezes  CV: regular rate and rhythm, normal S1 S2, no S3 or S4, no murmur, click or rub, no peripheral edema and peripheral pulses strong  ABDOMEN: soft, nontender, no hepatosplenomegaly, no masses and bowel sounds normal  MS: no gross musculoskeletal defects noted, no edema  SKIN: no suspicious lesions or rashes  NEURO: Normal strength and tone, mentation intact and speech normal  PSYCH: mentation appears " "normal, affect normal/bright  LYMPH: no cervical, supraclavicular, axillary, or inguinal adenopathy    Diagnostic Test Results:  Labs reviewed in Epic    ASSESSMENT/PLAN:      Diagnosis Comments   1. Routine general medical examination at a health care facility        2. Bilateral impacted cerumen  ME REMOVAL IMPACTED CERUMEN IRRIGATION/LVG UNILAT   Removed by nursing staff      3. Overweight (BMI 25.0-29.9)  Hemoglobin A1c   Start 25 grams fiber daily, 64 oz fluids, cut out sugary drinks, increase fruits and vegies to at least 5/day and cardiovascular exercise at least 30 min daily       4. Need for hepatitis C screening test  Hepatitis C Screen Reflex to HCV RNA Quant and Genotype       5. Screening for HIV (human immunodeficiency virus)  HIV Antigen Antibody Combo       6. Lipid screening  Lipid panel reflex to direct LDL Non-fasting                 COUNSELING:   Reviewed preventive health counseling, as reflected in patient instructions      BMI:   Estimated body mass index is 29.1 kg/m  as calculated from the following:    Height as of this encounter: 1.867 m (6' 1.5\").    Weight as of this encounter: 101.4 kg (223 lb 9.6 oz).   Weight management plan: Discussed healthy diet and exercise guidelines      He reports that he has never smoked. He has never used smokeless tobacco.            FA[facility]  "

## 2023-05-11 NOTE — NURSING NOTE
Bilateral ear wash completed with large amount of cerumen removed from both ears. Pt tolerated well. Chante Corona LPN

## 2023-05-12 LAB
HCV AB SERPL QL IA: NONREACTIVE
HIV 1+2 AB+HIV1 P24 AG SERPL QL IA: NONREACTIVE

## 2023-05-19 ENCOUNTER — OFFICE VISIT (OUTPATIENT)
Dept: URGENT CARE | Facility: URGENT CARE | Age: 53
End: 2023-05-19
Payer: COMMERCIAL

## 2023-05-19 VITALS
OXYGEN SATURATION: 97 % | TEMPERATURE: 97.9 F | RESPIRATION RATE: 20 BRPM | DIASTOLIC BLOOD PRESSURE: 82 MMHG | SYSTOLIC BLOOD PRESSURE: 118 MMHG | HEART RATE: 79 BPM

## 2023-05-19 DIAGNOSIS — S61.315A LACERATION OF LEFT RING FINGER WITHOUT FOREIGN BODY WITH DAMAGE TO NAIL, INITIAL ENCOUNTER: Primary | ICD-10-CM

## 2023-05-19 PROCEDURE — 12002 RPR S/N/AX/GEN/TRNK2.6-7.5CM: CPT | Performed by: INTERNAL MEDICINE

## 2023-05-20 NOTE — PATIENT INSTRUCTIONS
Suture removal in 10 days.  Use ibuprofen 400 - 600 mg three times daily as needed for pain.  Icing and elevation may be helpful in the next several days.  Monitor for increasing redness or swelling of the finger, especially for redness / swelling extending down the finger.  Expect that the transected portion of nail will become loose and fall off within several weeks.  Will want to keep the finger protected from blows or stress as the wound is healing.    Apply antibiotic ointment and dressing twice daily for the next three days.  Give the finger one hour of air time between dressing changes.  After three days can use dressings as needed for comfort.  Keep open to air overnight.

## 2023-05-20 NOTE — PROGRESS NOTES
SUBJECTIVE:  Patient reports that he was cutting food in kitchen with a very sharp knife when he lost focus and the knife sliced through the tip of the left ring finger.  This occurred about 30-60 minutes prior to presentation in the clinic.    OBJECTIVE:  /82   Pulse 79   Temp 97.9  F (36.6  C)   Resp 20   SpO2 97%   GEN: adult male, no apparent distress   LEFT RING FINGER: there is complete transection of the distal - lateral 1/4 of the fingernail with a circular flap type laceration that passes through the finger tip laterally; the lateral third of the fingertip is attached through a medial pedicle.  Total length of the laceration is 2.8 cm    LACERATION REPAIR:  Anesthesia with a digital block of the left ring finger using 2 ml 1% lidocaine without epinephrine.  5 simple interrupted sutures using 4-0 nylon are placed; 3 of the sutures are placed to secure the cutaneous portion of the partially transected finger tip pedicle and 2 of the sutures are used to secure the nail fragment.  Bacitracin ointment is applied and the finger is dressed with a non-adherent gauze pad secured with Coban wrap.    ASSESSMENT/PLAN:    ICD-10-CM    1. Laceration of left ring finger without foreign body with damage to nail, initial encounter  S61.315A REPAIR SUPERFICIAL, WOUND BODY 2.6-7.5 CM        Patient Instructions   Suture removal in 10 days.  Use ibuprofen 400 - 600 mg three times daily as needed for pain.  Icing and elevation may be helpful in the next several days.  Monitor for increasing redness or swelling of the finger, especially for redness / swelling extending down the finger.  Expect that the transected portion of nail will become loose and fall off within several weeks.  Will want to keep the finger protected from blows or stress as the wound is healing.    Apply antibiotic ointment and dressing twice daily for the next three days.  Give the finger one hour of air time between dressing changes.  After three  days can use dressings as needed for comfort.  Keep open to air overnight.     Jerry Ruiz MD

## 2023-10-12 ENCOUNTER — IMMUNIZATION (OUTPATIENT)
Dept: PEDIATRICS | Facility: CLINIC | Age: 53
End: 2023-10-12
Payer: COMMERCIAL

## 2023-10-12 DIAGNOSIS — Z23 NEED FOR PROPHYLACTIC VACCINATION AND INOCULATION AGAINST INFLUENZA: Primary | ICD-10-CM

## 2023-10-12 PROCEDURE — 99207 PR NO CHARGE NURSE ONLY: CPT

## 2023-10-12 PROCEDURE — 90471 IMMUNIZATION ADMIN: CPT

## 2023-10-12 PROCEDURE — 90682 RIV4 VACC RECOMBINANT DNA IM: CPT

## 2024-04-11 ENCOUNTER — PATIENT OUTREACH (OUTPATIENT)
Dept: CARE COORDINATION | Facility: CLINIC | Age: 54
End: 2024-04-11
Payer: COMMERCIAL

## 2024-04-25 ENCOUNTER — PATIENT OUTREACH (OUTPATIENT)
Dept: CARE COORDINATION | Facility: CLINIC | Age: 54
End: 2024-04-25
Payer: COMMERCIAL

## 2024-06-04 SDOH — HEALTH STABILITY: PHYSICAL HEALTH: ON AVERAGE, HOW MANY MINUTES DO YOU ENGAGE IN EXERCISE AT THIS LEVEL?: 40 MIN

## 2024-06-04 SDOH — HEALTH STABILITY: PHYSICAL HEALTH: ON AVERAGE, HOW MANY DAYS PER WEEK DO YOU ENGAGE IN MODERATE TO STRENUOUS EXERCISE (LIKE A BRISK WALK)?: 3 DAYS

## 2024-06-04 ASSESSMENT — SOCIAL DETERMINANTS OF HEALTH (SDOH): HOW OFTEN DO YOU GET TOGETHER WITH FRIENDS OR RELATIVES?: THREE TIMES A WEEK

## 2024-06-05 ENCOUNTER — OFFICE VISIT (OUTPATIENT)
Dept: PEDIATRICS | Facility: CLINIC | Age: 54
End: 2024-06-05
Attending: PHYSICIAN ASSISTANT
Payer: COMMERCIAL

## 2024-06-05 VITALS
RESPIRATION RATE: 16 BRPM | DIASTOLIC BLOOD PRESSURE: 72 MMHG | SYSTOLIC BLOOD PRESSURE: 117 MMHG | BODY MASS INDEX: 29.16 KG/M2 | OXYGEN SATURATION: 100 % | WEIGHT: 220 LBS | HEART RATE: 71 BPM | HEIGHT: 73 IN | TEMPERATURE: 97.8 F

## 2024-06-05 DIAGNOSIS — R73.01 IFG (IMPAIRED FASTING GLUCOSE): ICD-10-CM

## 2024-06-05 DIAGNOSIS — Z00.00 ROUTINE GENERAL MEDICAL EXAMINATION AT A HEALTH CARE FACILITY: Primary | ICD-10-CM

## 2024-06-05 LAB — HBA1C MFR BLD: 5.1 % (ref 0–5.6)

## 2024-06-05 PROCEDURE — 90715 TDAP VACCINE 7 YRS/> IM: CPT | Performed by: INTERNAL MEDICINE

## 2024-06-05 PROCEDURE — 90471 IMMUNIZATION ADMIN: CPT | Performed by: INTERNAL MEDICINE

## 2024-06-05 PROCEDURE — 80053 COMPREHEN METABOLIC PANEL: CPT | Performed by: INTERNAL MEDICINE

## 2024-06-05 PROCEDURE — 36415 COLL VENOUS BLD VENIPUNCTURE: CPT | Performed by: INTERNAL MEDICINE

## 2024-06-05 PROCEDURE — 83036 HEMOGLOBIN GLYCOSYLATED A1C: CPT | Performed by: INTERNAL MEDICINE

## 2024-06-05 PROCEDURE — 80061 LIPID PANEL: CPT | Performed by: INTERNAL MEDICINE

## 2024-06-05 PROCEDURE — 91320 SARSCV2 VAC 30MCG TRS-SUC IM: CPT | Performed by: INTERNAL MEDICINE

## 2024-06-05 PROCEDURE — 90480 ADMN SARSCOV2 VAC 1/ONLY CMP: CPT | Performed by: INTERNAL MEDICINE

## 2024-06-05 PROCEDURE — 99396 PREV VISIT EST AGE 40-64: CPT | Mod: 25 | Performed by: INTERNAL MEDICINE

## 2024-06-05 ASSESSMENT — PAIN SCALES - GENERAL: PAINLEVEL: MILD PAIN (2)

## 2024-06-05 NOTE — PATIENT INSTRUCTIONS
"Colonoscopy :  2032.    COVID and Adacel (tetanus) today.    Lab work today:  We can do labs in the exam room today, or you can get them done downstairs in the lab.      If you are going downstairs:  Directions:  As you walk through the first floor, you'll see (on the right) first the pharmacy, then some bathrooms, then the \"Lab and Imaging\" area. Give them your name at the window there and wait for them to call you.     Try to limit complex carbs (rice, bread, pasta, potatoes, cereals) and simple carbs (pop, juice, alcohol, and even milk.)  Eating more lean proteins (chicken, fish, eggs, beans, nuts) and unlimited vegetables and fruits can definitely help as well.     In general, try to spread meals out during the day, eating smaller breakfasts, lunches and dinners--and having healthy snacks in between.  It's a good idea to limit your carbs at mealtimes to 60-75 grams of carbs, and to limit your carbs at snacktimes to 15-30 grams of carbs.  (Check the food labels to add up these carbs.)       Preventive Care Advice   This is general advice we often give to help people stay healthy. Your care team may have specific advice just for you. Please talk to your care team about your own preventive care needs.  Lifestyle  Exercise at least 150 minutes each week (30 minutes a day, 5 days a week).  Do muscle strengthening activities 2 days a week. These help control your weight and prevent disease.  No smoking.  Wear sunscreen to prevent skin cancer.  Have your home tested for radon every 2 to 5 years. Radon is a colorless, odorless gas that can harm your lungs. To learn more, go to www.health.[facility].[place].us and search for \"Radon in Homes.\"  Keep guns unloaded and locked up in a safe place like a safe or gun vault, or, use a gun lock and hide the keys. Always lock away bullets separately. To learn more, visit [employer].[place].gov and search for \"safe gun storage.\"  Nutrition  Eat 5 or more servings of fruits and vegetables each day.  Try " wheat bread, brown rice and whole grain pasta (instead of white bread, rice, and pasta).  Get enough calcium and vitamin D. Check the label on foods and aim for 100% of the RDA (recommended daily allowance).  Regular exams  Have a dental exam and cleaning every 6 months.  See your health care team every year to talk about:  Any changes in your health.  Any medicines your care team has prescribed.  Preventive care, family planning, and ways to prevent chronic diseases.  Shots (vaccines)   HPV shots (up to age 26), if you've never had them before.  Hepatitis B shots (up to age 59), if you've never had them before.  COVID-19 shot: Get this shot when it's due.  Flu shot: Get a flu shot every year.  Tetanus shot: Get a tetanus shot every 10 years.  Pneumococcal, hepatitis A, and RSV shots: Ask your care team if you need these based on your risk.  Shingles shot (for age 50 and up).  General health tests  Diabetes screening:  Starting at age 35, Get screened for diabetes at least every 3 years.  If you are younger than age 35, ask your care team if you should be screened for diabetes.  Cholesterol test: At age 39, start having a cholesterol test every 5 years, or more often if advised.  Bone density scan (DEXA): At age 50, ask your care team if you should have this scan for osteoporosis (brittle bones).  Hepatitis C: Get tested at least once in your life.  Abdominal aortic aneurysm screening: Talk to your doctor about having this screening if you:  Have ever smoked; and  Are biologically male; and  Are between the ages of 65 and 75.  STIs (sexually transmitted infections)  Before age 24: Ask your care team if you should be screened for STIs.  After age 24: Get screened for STIs if you're at risk. You are at risk for STIs (including HIV) if:  You are sexually active with more than one person.  You don't use condoms every time.  You or a partner was diagnosed with a sexually transmitted infection.  If you are at risk for  HIV, ask about PrEP medicine to prevent HIV.  Get tested for HIV at least once in your life, whether you are at risk for HIV or not.  Cancer screening tests  Cervical cancer screening: If you have a cervix, begin getting regular cervical cancer screening tests at age 21. Most people who have regular screenings with normal results can stop after age 65. Talk about this with your provider.  Breast cancer scan (mammogram): If you've ever had breasts, begin having regular mammograms starting at age 40. This is a scan to check for breast cancer.  Colon cancer screening: It is important to start screening for colon cancer at age 45.  Have a colonoscopy test every 10 years (or more often if you're at risk) Or, ask your provider about stool tests like a FIT test every year or Cologuard test every 3 years.  To learn more about your testing options, visit: www.[employer]150518.pdf.  For help making a decision, visit: juani/op14917.  Prostate cancer screening test: If you have a prostate and are age 55 to 69, ask your provider if you would benefit from a yearly prostate cancer screening test.  Lung cancer screening: If you are a current or former smoker age 50 to 80, ask your care team if ongoing lung cancer screenings are right for you.  For informational purposes only. Not to replace the advice of your health care provider. Copyright   2023 [facility]. All rights reserved. Clinically reviewed by the [facility]. [employer] [id number] - REV 04/24.    Learning About Stress  What is stress?     Stress is your body's response to a hard situation. Your body can have a physical, emotional, or mental response. Stress is a fact of life for most people, and it affects everyone differently. What causes stress for you may not be stressful for someone else.  A lot of things can cause stress. You may feel stress when you go on a job interview, take a test, or run a race. This kind of short-term  stress is normal and even useful. It can help you if you need to work hard or react quickly. For example, stress can help you finish an important job on time.  Long-term stress is caused by ongoing stressful situations or events. Examples of long-term stress include long-term health problems, ongoing problems at work, or conflicts in your family. Long-term stress can harm your health.  How does stress affect your health?  When you are stressed, your body responds as though you are in danger. It makes hormones that speed up your heart, make you breathe faster, and give you a burst of energy. This is called the fight-or-flight stress response. If the stress is over quickly, your body goes back to normal and no harm is done.  But if stress happens too often or lasts too long, it can have bad effects. Long-term stress can make you more likely to get sick, and it can make symptoms of some diseases worse. If you tense up when you are stressed, you may develop neck, shoulder, or low back pain. Stress is linked to high blood pressure and heart disease.  Stress also harms your emotional health. It can make you dawkins, tense, or depressed. Your relationships may suffer, and you may not do well at work or school.  What can you do to manage stress?  You can try these things to help manage stress:   Do something active. Exercise or activity can help reduce stress. Walking is a great way to get started. Even everyday activities such as housecleaning or yard work can help.  Try yoga or gonzales chi. These techniques combine exercise and meditation. You may need some training at first to learn them.  Do something you enjoy. For example, listen to music or go to a movie. Practice your hobby or do volunteer work.  Meditate. This can help you relax, because you are not worrying about what happened before or what may happen in the future.  Do guided imagery. Imagine yourself in any setting that helps you feel calm. You can use online videos,  "books, or a teacher to guide you.  Do breathing exercises. For example:  From a standing position, bend forward from the waist with your knees slightly bent. Let your arms dangle close to the floor.  Breathe in slowly and deeply as you return to a standing position. Roll up slowly and lift your head last.  Hold your breath for just a few seconds in the standing position.  Breathe out slowly and bend forward from the waist.  Let your feelings out. Talk, laugh, cry, and express anger when you need to. Talking with supportive friends or family, a counselor, or a cinthya leader about your feelings is a healthy way to relieve stress. Avoid discussing your feelings with people who make you feel worse.  Write. It may help to write about things that are bothering you. This helps you find out how much stress you feel and what is causing it. When you know this, you can find better ways to cope.  What can you do to prevent stress?  You might try some of these things to help prevent stress:  Manage your time. This helps you find time to do the things you want and need to do.  Get enough sleep. Your body recovers from the stresses of the day while you are sleeping.  Get support. Your family, friends, and community can make a difference in how you experience stress.  Limit your news feed. Avoid or limit time on social media or news that may make you feel stressed.  Do something active. Exercise or activity can help reduce stress. Walking is a great way to get started.  Where can you learn more?  Go to https://www.[employer].net/patiented  Enter N032 in the search box to learn more about \"Learning About Stress.\"  Current as of: October 24, 2023               Content Version: 14.0    1094-4343 [employer].   Care instructions adapted under license by your healthcare professional. If you have questions about a medical condition or this instruction, always ask your healthcare professional. Healthwise, [employer] " disclaims any warranty or liability for your use of this information.      Learning About Alcohol Use Disorder  What is alcohol use disorder?  Alcohol use disorder means that a person drinks alcohol even though it causes harm to themselves or others. It can range from mild to severe. The more symptoms of this disorder you have, the more severe it may be. People who have it may find it hard to control their use of alcohol.  People who have this disorder may argue with others about how much they're drinking. Their job may be affected because of drinking. They may drink when it's dangerous or illegal, such as when they drive. They also may have a strong need, or craving, to drink. They may feel like they must drink just to get by. Their drinking may increase their risk of getting hurt or being in a car crash.  Over time, drinking too much alcohol may cause health problems. These may include high blood pressure, liver problems, or problems with digestion.  What are the symptoms?  Maybe you've wondered about your alcohol habits or how to tell if your drinking is becoming a problem.  Here are some of the symptoms of alcohol use disorder. You may have it if you have two or more of the following symptoms:  You drink larger amounts of alcohol than you ever meant to. Or you've been drinking for a longer time than you ever meant to.  You can't cut down or control your use. Or you constantly wish you could cut down.  You spend a lot of time getting or drinking alcohol or recovering from its effects.  You have strong cravings for alcohol.  You can no longer do your main jobs at work, at school, or at home.  You keep drinking alcohol, even though your use hurts your relationships.  You have stopped doing important activities because of your alcohol use.  You drink alcohol in situations where doing so is dangerous.  You keep drinking alcohol even though you know it's causing health problems.  You need more and more alcohol to get  "the same effect, or you get less effect from the same amount over time. This is called tolerance.  You have uncomfortable symptoms when you stop drinking alcohol or use less. This is called withdrawal.  Alcohol use disorder can range from mild to severe. The more symptoms you have, the more severe the disorder may be.  You might not realize that your drinking is a problem. You might not drink large amounts when you drink. Or you might go for days or weeks between drinking episodes. But even if you don't drink very often, your drinking could still be harmful and put you at risk.  How is alcohol use disorder treated?  Getting help is up to you. But you don't have to do it alone. There are many people and kinds of treatments that can help.  Treatment for alcohol use disorder can include:  Group therapy, one or more types of counseling, and alcohol education.  Medicines that help to:  Reduce withdrawal symptoms and help you safely stop drinking.  Reduce cravings for alcohol.  Support groups. These groups include Alcoholics Anonymous and [employer] (Self-Management and Recovery Training).  Some people are able to stop or cut back on drinking with help from a counselor. People who have moderate to severe alcohol use disorder may need medical treatment. They may need to stay in a hospital or treatment center.  You may have a treatment team to help you. This team may include a psychologist or psychiatrist, counselors, doctors, social workers, nurses, and a . A  helps plan and manage your treatment.  Follow-up care is a key part of your treatment and safety. Be sure to make and go to all appointments, and call your doctor if you are having problems. It's also a good idea to know your test results and keep a list of the medicines you take.  Where can you learn more?  Go to https://www.healthwise.net/patiented  Enter H758 in the search box to learn more about \"Learning About Alcohol Use " "Disorder.\"  Current as of: November 15, 2023               Content Version: 14.0    6475-1016 [employer].   Care instructions adapted under license by your healthcare professional. If you have questions about a medical condition or this instruction, always ask your healthcare professional. [employer] disclaims any warranty or liability for your use of this information.      "

## 2024-06-05 NOTE — PROGRESS NOTES
"Preventive Care Visit  [facility] MD, Internal Medicine - Pediatrics  Jun 5, 2024      Assessment & Plan     Routine general medical examination at a health care facility  Discussed diet, exercise, testicular self exam, blood pressure, cholesterol, and need for cancer surveillance at appropriate ages.   - Hemoglobin A1c; Future  - Lipid panel reflex to direct LDL Fasting; Future  - Comprehensive metabolic panel (BMP + Alb, Alk Phos, ALT, AST, Total. Bili, TP); Future    IFG (impaired fasting glucose)  Successful weight loss.  Discussed ongoing limiting carbs.             BMI  Estimated body mass index is 28.85 kg/m  as calculated from the following:    Height as of this encounter: 1.86 m (6' 1.23\").    Weight as of this encounter: 99.8 kg (220 lb).   Weight management plan: Discussed healthy diet and exercise guidelines    Counseling  Appropriate preventive services were discussed with this patient, including applicable screening as appropriate for fall prevention, nutrition, physical activity, Tobacco-use cessation, weight loss and cognition.  Checklist reviewing preventive services available has been given to the patient.  Reviewed patient's diet, addressing concerns and/or questions.   He is at risk for lack of exercise and has been provided with information to increase physical activity for the benefit of his well-being.   The patient reports drinking more than one alcoholic drink per day and sometimes engages in binge or excessive drinking. The patient was counseled and given information about possible harmful effects of excessive alcohol intake as well as where to get help for alcohol problems.         Nicol Garcia is a 54 year old, presenting for the following:  Physical        6/5/2024    10:06 AM   Additional Questions   Roomed by Tanya Reyez CMA   Accompanied by N/A         6/5/2024    10:06 AM   Patient Reported Additional Medications   Patient reports taking the " following new medications N/A          HPI  Tinnitus.              6/4/2024   General Health   How would you rate your overall physical health? Good   Feel stress (tense, anxious, or unable to sleep) To some extent   (!) STRESS CONCERN      6/4/2024   Nutrition   Three or more servings of calcium each day? Yes   Diet: Regular (no restrictions)   How many servings of fruit and vegetables per day? (!) 2-3   How many sweetened beverages each day? 0-1         6/4/2024   Exercise   Days per week of moderate/strenous exercise 3 days   Average minutes spent exercising at this level 40 min         6/4/2024   Social Factors   Frequency of gathering with friends or relatives Three times a week   Worry food won't last until get money to buy more No   Food not last or not have enough money for food? No   Do you have housing?  Yes   Are you worried about losing your housing? No   Lack of transportation? No   Unable to get utilities (heat,electricity)? No         6/4/2024   Fall Risk   Fallen 2 or more times in the past year? No   Trouble with walking or balance? No          6/4/2024   Dental   Dentist two times every year? Yes         6/4/2024   TB Screening   Were you born outside of the [place]? No         Today's PHQ-2 Score:       6/4/2024    10:37 AM   PHQ-2 ( 1999 [employer])   Q1: Little interest or pleasure in doing things 0   Q2: Feeling down, depressed or hopeless 0   PHQ-2 Score 0   Q1: Little interest or pleasure in doing things Not at all   Q2: Feeling down, depressed or hopeless Not at all   PHQ-2 Score 0           6/4/2024   Substance Use   Alcohol more than 3/day or more than 7/wk Yes   How often do you have a drink containing alcohol 2 to 3 times a week   How many alcohol drinks on typical day 3 or 4   How often do you have 5+ drinks at one occasion Never   Audit 2/3 Score 1   How often not able to stop drinking once started Never   How often failed to do what normally expected Never   How often needed first drink in am  "after a heavy drinking session Never   How often feeling of guilt or remorse after drinking Never   How often unable to remember what happened the night before Never   Have you or someone else been injured because of your drinking No   Has anyone been concerned or suggested you cut down on drinking No   TOTAL SCORE - AUDIT 4   Do you use any other substances recreationally? No     Social History     Tobacco Use    Smoking status: Never    Smokeless tobacco: Never   Vaping Use    Vaping status: Never Used   Substance Use Topics    Alcohol use: Yes     Comment: 10-14 drinks a week    Drug use: No           6/4/2024   STI Screening   New sexual partner(s) since last STI/HIV test? No   ASCVD Risk   The ASCVD Risk score (Lorene LOWRY, et al., 2019) failed to calculate for the following reasons:    The systolic blood pressure is missing           Reviewed and updated as needed this visit by Provider                             Objective    Exam  There were no vitals taken for this visit.   Estimated body mass index is 29.1 kg/m  as calculated from the following:    Height as of 5/11/23: 1.867 m (6' 1.5\").    Weight as of 5/11/23: 101.4 kg (223 lb 9.6 oz).    Physical Exam  GENERAL: alert and no distress  EYES: Eyes grossly normal to inspection, PERRL and conjunctivae and sclerae normal  HENT: ear canals and TM's normal, nose and mouth without ulcers or lesions  NECK: no adenopathy, no asymmetry, masses, or scars  RESP: lungs clear to auscultation - no rales, rhonchi or wheezes  CV: regular rate and rhythm, normal S1 S2, no S3 or S4, no murmur, click or rub, no peripheral edema  ABDOMEN: soft, nontender, no hepatosplenomegaly, no masses and bowel sounds normal  MS: no gross musculoskeletal defects noted, no edema  SKIN: no suspicious lesions or rashes  NEURO: Normal strength and tone, mentation intact and speech normal  PSYCH: mentation appears normal, affect normal/bright        Signed Electronically by: Mukesh JALLOH" MD [place]

## 2024-06-06 LAB
ALBUMIN SERPL BCG-MCNC: 4.6 G/DL (ref 3.5–5.2)
ALP SERPL-CCNC: 50 U/L (ref 40–150)
ALT SERPL W P-5'-P-CCNC: 16 U/L (ref 0–70)
ANION GAP SERPL CALCULATED.3IONS-SCNC: 12 MMOL/L (ref 7–15)
AST SERPL W P-5'-P-CCNC: 22 U/L (ref 0–45)
BILIRUB SERPL-MCNC: 0.8 MG/DL
BUN SERPL-MCNC: 12.9 MG/DL (ref 6–20)
CALCIUM SERPL-MCNC: 9.5 MG/DL (ref 8.6–10)
CHLORIDE SERPL-SCNC: 104 MMOL/L (ref 98–107)
CHOLEST SERPL-MCNC: 165 MG/DL
CREAT SERPL-MCNC: 1.1 MG/DL (ref 0.67–1.17)
DEPRECATED HCO3 PLAS-SCNC: 23 MMOL/L (ref 22–29)
EGFRCR SERPLBLD CKD-EPI 2021: 80 ML/MIN/1.73M2
FASTING STATUS PATIENT QL REPORTED: YES
FASTING STATUS PATIENT QL REPORTED: YES
GLUCOSE SERPL-MCNC: 82 MG/DL (ref 70–99)
HDLC SERPL-MCNC: 53 MG/DL
LDLC SERPL CALC-MCNC: 99 MG/DL
NONHDLC SERPL-MCNC: 112 MG/DL
POTASSIUM SERPL-SCNC: 4.4 MMOL/L (ref 3.4–5.3)
PROT SERPL-MCNC: 6.8 G/DL (ref 6.4–8.3)
SODIUM SERPL-SCNC: 139 MMOL/L (ref 135–145)
TRIGL SERPL-MCNC: 64 MG/DL

## 2024-06-07 NOTE — RESULT ENCOUNTER NOTE
Calos Garcia ,    The results from your recent lab work are within normal limits.    -All of your labs are normal.      Thank you for choosing [facility] for your health care needs,      Harper Corral PA-C

## 2024-08-15 ENCOUNTER — OFFICE VISIT (OUTPATIENT)
Dept: ORTHOPEDICS | Facility: CLINIC | Age: 54
End: 2024-08-15
Payer: COMMERCIAL

## 2024-08-15 ENCOUNTER — ANCILLARY PROCEDURE (OUTPATIENT)
Dept: GENERAL RADIOLOGY | Facility: CLINIC | Age: 54
End: 2024-08-15
Attending: STUDENT IN AN ORGANIZED HEALTH CARE EDUCATION/TRAINING PROGRAM
Payer: COMMERCIAL

## 2024-08-15 VITALS
SYSTOLIC BLOOD PRESSURE: 118 MMHG | DIASTOLIC BLOOD PRESSURE: 76 MMHG | HEIGHT: 73 IN | WEIGHT: 220 LBS | BODY MASS INDEX: 29.16 KG/M2

## 2024-08-15 DIAGNOSIS — M79.671 BILATERAL FOOT PAIN: ICD-10-CM

## 2024-08-15 DIAGNOSIS — M79.672 BILATERAL FOOT PAIN: ICD-10-CM

## 2024-08-15 DIAGNOSIS — M79.671 RIGHT FOOT PAIN: ICD-10-CM

## 2024-08-15 DIAGNOSIS — S93.691S: ICD-10-CM

## 2024-08-15 DIAGNOSIS — S93.601A SPRAIN OF RIGHT FOOT, INITIAL ENCOUNTER: Primary | ICD-10-CM

## 2024-08-15 PROCEDURE — 73630 X-RAY EXAM OF FOOT: CPT | Mod: TC | Performed by: RADIOLOGY

## 2024-08-15 PROCEDURE — 99213 OFFICE O/P EST LOW 20 MIN: CPT | Performed by: STUDENT IN AN ORGANIZED HEALTH CARE EDUCATION/TRAINING PROGRAM

## 2024-08-15 NOTE — PROGRESS NOTES
ASSESSMENT & PLAN    Jose was seen today for pain.    Diagnoses and all orders for this visit:    Sprain of right foot, initial encounter  -     Orthotics, Mastectomy and Custom Compression Orders    Right foot pain  -     XR Foot Right G/E 3 Views; Future    Spring ligament tear, right, sequela  -     Orthotics, Mastectomy and Custom Compression Orders    Bilateral foot pain  -     Orthotics, Mastectomy and Custom Compression Orders        54 year old male presents with right second toe pain after accidentally kicking a can approximately 1 month ago.  On exam, he has no significant bony tenderness, mild tenderness of second MTP joint and primarily soft tissue tenderness both medial and lateral to his second MTP joint.  X-rays negative for any acute fractures.  Also has pain with medial stress of his second toe at the MTP joint.  Discussed that symptoms are consistent with a bony contusion with likely associated digital nerve irritation, potentially in the setting of collateral ligament sprain.  Symptoms significantly improve with buddy taping.      Plan:  -Buddy tape 2nd and 3rd toes for at least 2 weeks then with activity for anther few weeks  -Wear stiff soled shoes at all times until symptoms improve  -Voltaren gel up to every 6 hours over area of pain   -If no improvement in the next 4 weeks, please reach out and we can consider MRI or injection  - Follow-up as needed or symptoms worsen or fail to improve    Dr. Evelyn Mayes, DO, CAQ  [facility]  Sports Medicine     -----  Chief Complaint   Patient presents with    Right Foot - Pain       SUBJECTIVE  Vasile Stallworth is a/an 54 year old male who is seen as a self referral for evaluation of right foot pain.  Onset was 3-4 weeks ago after accidentally kicking a can of paint. Pain is located over the base of the second toe. Symptoms are worsened by walking for long periods of time.  He has tried buddy taping and supportive shoes, which  "have significantly improved his symptoms.  Unsure if his symptoms have really improved at all but he has been able to hike and cycle well celina taping with minimal pain. Associated symptoms include burning pain just medial to the second MTP.    The patient is seen alone    Prior injury/Surgical history of affected joint: 2007 ankle ligament reconstruction and hx plantar fascitis   Social History/Occupation: Works at a desk    REVIEW OF SYSTEMS:  Pertinent positives/negative: As stated above in HPI    OBJECTIVE:  /76   Ht 1.86 m (6' 1.23\")   Wt 99.8 kg (220 lb)   BMI 28.84 kg/m     General: Alert and in no distress  CV: Extremities appear well perfused   Resp: normal respiratory effort  MSK:  Right foot exam  Tender to palpation mildly of second MTP joint, more severe tenderness to palpation between first and second MTP joint and between second and third MTP joint.  No bony tenderness.  No bruising, swelling, or ecchymosis  Full range of motion of the second toe to flexion and extension with intact muscle strength  Sensation to light touch grossly intact  Able to perform a heel raise without significant pain  Negative metatarsal squeeze test  Negative Lisfranc tenderness    RADIOLOGY:  Final results and radiologist's interpretation available in the [facility] health record.  Images below were personally reviewed and discussed with the patient in the office today.  My personal interpretation of the performed imaging: X-ray of the right foot today in clinic reveals no acute osseous abnormalities or evidence of fractures.                 Disclaimer: This note consists of text and symbols derived from dictation and/or voice recognition software. As a result, there may be errors in the script that have gone undetected. Please consider this when interpreting information found in this chart.    "

## 2024-08-15 NOTE — LETTER
8/15/2024      Vasile Stallworth  [address] [address]      Dear Colleague,    Thank you for referring your patient, Vasile Stallworth, to the [facility] [place]. Please see a copy of my visit note below.    ASSESSMENT & PLAN    Jose was seen today for pain.    Diagnoses and all orders for this visit:    Sprain of right foot, initial encounter  -     Orthotics, Mastectomy and Custom Compression Orders    Right foot pain  -     XR Foot Right G/E 3 Views; Future    Spring ligament tear, right, sequela  -     Orthotics, Mastectomy and Custom Compression Orders    Bilateral foot pain  -     Orthotics, Mastectomy and Custom Compression Orders        54 year old male presents with right second toe pain after accidentally kicking a can approximately 1 month ago.  On exam, he has no significant bony tenderness, mild tenderness of second MTP joint and primarily soft tissue tenderness both medial and lateral to his second MTP joint.  X-rays negative for any acute fractures.  Also has pain with medial stress of his second toe at the MTP joint.  Discussed that symptoms are consistent with a bony contusion with likely associated digital nerve irritation, potentially in the setting of collateral ligament sprain.  Symptoms significantly improve with buddy taping.      Plan:  -Buddy tape 2nd and 3rd toes for at least 2 weeks then with activity for anther few weeks  -Wear stiff soled shoes at all times until symptoms improve  -Voltaren gel up to every 6 hours over area of pain   -If no improvement in the next 4 weeks, please reach out and we can consider MRI or injection  - Follow-up as needed or symptoms worsen or fail to improve    Dr. Evelyn Mayes, DO, CAQ  [facility]  Sports Medicine     -----  Chief Complaint   Patient presents with     Right Foot - Pain       SUBJECTIVE  Vasile Stallworth is a/an 54 year old male who is seen as a self referral for evaluation of  "right foot pain.  Onset was 3-4 weeks ago after accidentally kicking a can of paint. Pain is located over the base of the second toe. Symptoms are worsened by walking for long periods of time.  He has tried celina taping and supportive shoes, which have significantly improved his symptoms.  Unsure if his symptoms have really improved at all but he has been able to hike and cycle well celina taping with minimal pain. Associated symptoms include burning pain just medial to the second MTP.    The patient is seen alone    Prior injury/Surgical history of affected joint: 2007 ankle ligament reconstruction and hx plantar fascitis   Social History/Occupation: Works at a [employer]    REVIEW OF SYSTEMS:  Pertinent positives/negative: As stated above in HPI    OBJECTIVE:  /76   Ht 1.86 m (6' 1.23\")   Wt 99.8 kg (220 lb)   BMI 28.84 kg/m     General: Alert and in no distress  CV: Extremities appear well perfused   Resp: normal respiratory effort  MSK:  Right foot exam  Tender to palpation mildly of second MTP joint, more severe tenderness to palpation between first and second MTP joint and between second and third MTP joint.  No bony tenderness.  No bruising, swelling, or ecchymosis  Full range of motion of the second toe to flexion and extension with intact muscle strength  Sensation to light touch grossly intact  Able to perform a heel raise without significant pain  Negative metatarsal squeeze test  Negative Lisfranc tenderness    RADIOLOGY:  Final results and radiologist's interpretation available in the [facility] health record.  Images below were personally reviewed and discussed with the patient in the office today.  My personal interpretation of the performed imaging: X-ray of the right foot today in clinic reveals no acute osseous abnormalities or evidence of fractures.                 Disclaimer: This note consists of text and symbols derived from dictation and/or voice recognition software. As a result, there may be " errors in the script that have gone undetected. Please consider this when interpreting information found in this chart.        Again, thank you for allowing me to participate in the care of your patient.        Sincerely,        Evelyn Mayes, DO

## 2024-08-15 NOTE — PATIENT INSTRUCTIONS
1. Sprain of right foot, initial encounter    2. Right foot pain    3. Spring ligament tear, right, sequela    4. Bilateral foot pain        Plan:  -Philip tape 2nd and 3rd toes for at least 2 weeks then with activity for anther few weeks  -Wear stiff soled shoes at all times  -Voltaren gel up to every 6 hours over area of pain   -If no improvement in the next 4 weeks, please reach out and we can consider MRI or injection     If you have questions/concerns after your appointment, please send my team a [facility] message or call the clinic at [phone]. If you had imaging performed at your appointment today, you can expect to see your results in [facility] within approximately 48 business hours.     Dr. Evelyn Mayes, , [facility]  Sports Medicine and Orthopedics    Dr. Mayes's Clinic Locations:   [place] APPOINTMENTS: [phone]      [address] RADIOLOGY: [phone]   [place] [address] PHYSICAL THERAPY: [phone]    HAND THERAPY/OT: [phone]   [place] BILLING QUESTIONS: [phone] [address] FAX: [phone]   [place] [address]

## 2024-10-10 ENCOUNTER — IMMUNIZATION (OUTPATIENT)
Dept: PEDIATRICS | Facility: CLINIC | Age: 54
End: 2024-10-10
Payer: COMMERCIAL

## 2024-10-10 PROCEDURE — 90471 IMMUNIZATION ADMIN: CPT

## 2024-10-10 PROCEDURE — 91320 SARSCV2 VAC 30MCG TRS-SUC IM: CPT

## 2024-10-10 PROCEDURE — 90673 RIV3 VACCINE NO PRESERV IM: CPT

## 2024-10-10 PROCEDURE — 90480 ADMN SARSCOV2 VAC 1/ONLY CMP: CPT

## 2025-05-08 ENCOUNTER — PATIENT OUTREACH (OUTPATIENT)
Dept: CARE COORDINATION | Facility: CLINIC | Age: 55
End: 2025-05-08
Payer: COMMERCIAL

## 2025-05-21 ENCOUNTER — E-VISIT (OUTPATIENT)
Dept: URGENT CARE | Facility: CLINIC | Age: 55
End: 2025-05-21
Payer: COMMERCIAL

## 2025-05-21 DIAGNOSIS — R05.1 ACUTE COUGH: ICD-10-CM

## 2025-05-21 RX ORDER — ALBUTEROL SULFATE 90 UG/1
2 INHALANT RESPIRATORY (INHALATION) EVERY 4 HOURS PRN
Qty: 18 G | Refills: 0 | Status: SHIPPED | OUTPATIENT
Start: 2025-05-21

## 2025-05-21 RX ORDER — BENZONATATE 100 MG/1
100 CAPSULE ORAL 3 TIMES DAILY PRN
Qty: 30 CAPSULE | Refills: 0 | Status: SHIPPED | OUTPATIENT
Start: 2025-05-21

## 2025-05-21 NOTE — PATIENT INSTRUCTIONS
Viral Respiratory Infection: Care Instructions  Overview     A viral respiratory infection is an infection of the nose, sinuses, or throat caused by a virus. Colds and the flu are common types of viral respiratory infections.  The symptoms of a viral respiratory infection often start quickly. They include a fever, sore throat, and runny nose. You may also just not feel well. Or you may not want to eat much.  Most viral infections can be treated with home care. This may include drinking lots of fluids and taking over-the-counter pain medicine. You will probably feel better in 4 to 10 days.  Antibiotics are not used to treat a viral infection. Antibiotics don't kill viruses, so they won't help cure a viral illness.  In some cases, a doctor may prescribe antiviral medicine to help your body fight a serious viral infection.  Follow-up care is a key part of your treatment and safety. Be sure to make and go to all appointments, and call your doctor if you are having problems. It's also a good idea to know your test results and keep a list of the medicines you take.  How can you care for yourself at home?  To prevent dehydration, drink plenty of fluids. Choose water and other clear liquids until you feel better. If you have kidney, heart, or liver disease and have to limit fluids, talk with your doctor before you increase the amount of fluids you drink.  Ask your doctor if you can take an over-the-counter pain medicine, such as acetaminophen (Tylenol), ibuprofen (Advil, Motrin), or naproxen (Aleve). Be safe with medicines. Read and follow all instructions on the label. No one younger than 20 should take aspirin. It has been linked to Reye syndrome, a serious illness.  Be careful when taking over-the-counter cold or flu medicines and Tylenol at the same time. Many of these medicines have acetaminophen, which is Tylenol. Read the labels to make sure that you are not taking more than the recommended dose. Too much  "acetaminophen (Tylenol) can be harmful.  Get plenty of rest.  Use saline (saltwater) nasal washes to help keep your nasal passages open and wash out mucus and allergens. You can buy saline nose sprays at a grocery store or drugstore. Follow the instructions on the package. Or you can make your own at home. Add 1 teaspoon of non-iodized salt and 1 teaspoon of baking soda to 2 cups of distilled or boiled and cooled water. Fill a squeeze bottle or neti pot with the nasal wash. Then put the tip into your nostril, and lean over the sink. With your mouth open, gently squirt the liquid. Repeat on the other side.  Use a vaporizer or humidifier to add moisture to your bedroom. Follow the instructions for cleaning the machine.  Do not smoke or allow others to smoke around you. If you need help quitting, talk to your doctor about stop-smoking programs and medicines. These can increase your chances of quitting for good.  When should you call for help?   Call 911 anytime you think you may need emergency care. For example, call if:    You have severe trouble breathing.   Call your doctor now or seek immediate medical care if:    You have a new or higher fever.     Your fever lasts more than 48 hours.     You have trouble breathing.     You have a fever with a stiff neck or a severe headache.     You are sensitive to light.     You feel very sleepy or confused.   Watch closely for changes in your health, and be sure to contact your doctor if:    You do not get better as expected.   Where can you learn more?  Go to https://www.Hyper[employer].net/patiented  Enter Q795 in the search box to learn more about \"Viral Respiratory Infection: Care Instructions.\"  Current as of: April 30, 2024  Content Version: 14.4    3096-6474 [employer].   Care instructions adapted under license by your healthcare professional. If you have questions about a medical condition or this instruction, always ask your healthcare professional. [employer] disclaims any warranty or liability for your use of this information.    Thank you for choosing us for your care. Often coughs like this are due to viral infection.  To help with the cough I've prescribed a cough medication and an inhaler for you to use.  If you don't improve within the next 5 days, you should be seen for an in person visit with your doctor or in urgent care for further evaluation of your lungs and to check you for pneumonia.  I have placed an order for a prescription so that you can start treatment:  Orders Placed This Encounter   Medications     albuterol (PROAIR HFA/PROVENTIL HFA/VENTOLIN HFA) 108 (90 Base) MCG/ACT inhaler     Sig: Inhale 2 puffs into the lungs every 4 hours as needed for shortness of breath, wheezing or cough.     Dispense:  18 g     Refill:  0     Pharmacy may dispense brand covered by insurance (Proair, or proventil or ventolin or generic albuterol inhaler)     benzonatate (TESSALON) 100 MG capsule     Sig: Take 1 capsule (100 mg) by mouth 3 times daily as needed for cough.     Dispense:  30 capsule     Refill:  0        View your full visit summary for details by clicking on the link below. Your pharmacist will able to address any questions you may have about the medication.     If you're not feeling better within 5 days, please schedule an appointment.  You can schedule an appointment right here in [facility], or call [phone]  If the visit is for the same symptoms as your eVisit, we'll refund the cost of your eVisit if seen within seven days.

## 2025-06-24 ENCOUNTER — VIRTUAL VISIT (OUTPATIENT)
Dept: URGENT CARE | Facility: CLINIC | Age: 55
End: 2025-06-24
Payer: COMMERCIAL

## 2025-06-24 DIAGNOSIS — R63.0 DECREASED APPETITE: Primary | ICD-10-CM

## 2025-06-24 PROCEDURE — 98016 BRIEF COMUNICAJ TECH-BSD SVC: CPT

## 2025-06-24 NOTE — PATIENT INSTRUCTIONS
Continue drinking fluids as you are. If symptoms worsen or don't improve, be seen in person. If you start to have fever/chills/sweats, blood in stool, abdominal pain, go to the ER.

## 2025-06-24 NOTE — CONFIDENTIAL NOTE
Vasile is a 55 year old male who presents for a billable video visit.    ASSESSMENT/PLAN:  Diagnoses and all orders for this visit:    Decreased appetite        Patient Instructions   Continue drinking fluids as you are. If symptoms worsen or don't improve, be seen in person. If you start to have fever/chills/sweats, blood in stool, abdominal pain, go to the ER.      SUBJECTIVE:  Patient reports that over the past week he has started feeling what he feels like is dehydration.  He is urinating normal, with no pain on urination color is a light use light yellow, may be somewhat more frequent as he is trying to increase his fluid intake.  For example he has drink to 1-1/2 L of tea so far this morning and drinks water all throughout the day.  He has had very kind of mild discomfort on the left mid abdomen underneath the rib cage.  His appetite is decreased some.  He is not taking any medication he takes a multivitamin.  His mouth feels dry.  He has like 8-10 small bowel movements movements a day of a soft stool.  There is no blood in the stool.  About every few weeks he does get an episode of diarrhea and he had an episode of diarrhea about 3 days ago.  His skin feels elastic.  He eats healthy.  His lunch for example was brown rice and tofu.    OBJECTIVE:  Vitals not done due to this being a virtual visit    GENERAL: alert and no distress  EYES: Eyes grossly normal to inspection.  No discharge or erythema, or obvious scleral/conjunctival abnormalities.  RESP: No audible wheeze, cough, or visible cyanosis.    SKIN: Visible skin clear. No significant rash, abnormal pigmentation or lesions. Skin on arm elastic.   NEURO: Cranial nerves grossly intact.  Mentation and speech appropriate for age.  PSYCH: Appropriate affect, tone, and pace of words        Video-Visit Details    Type of service:  Video Visit  Video Start Time: 2:00 PM  Video End Time:2:09 PM    Originating Location (pt. Location): Home    Distant Location  (provider location):  [facility]     Platform used for Video Visit: AUGUSTA BarrettP, CNP

## 2025-08-04 ENCOUNTER — VIRTUAL VISIT (OUTPATIENT)
Dept: FAMILY MEDICINE | Facility: CLINIC | Age: 55
End: 2025-08-04
Payer: COMMERCIAL

## 2025-08-04 DIAGNOSIS — R68.2 DRY MOUTH: Primary | ICD-10-CM

## 2025-08-04 PROCEDURE — 98005 SYNCH AUDIO-VIDEO EST LOW 20: CPT | Performed by: FAMILY MEDICINE

## 2025-08-11 ENCOUNTER — PATIENT OUTREACH (OUTPATIENT)
Dept: CARE COORDINATION | Facility: CLINIC | Age: 55
End: 2025-08-11

## 2025-08-13 ENCOUNTER — PATIENT OUTREACH (OUTPATIENT)
Dept: CARE COORDINATION | Facility: CLINIC | Age: 55
End: 2025-08-13
Payer: COMMERCIAL

## (undated) DEVICE — KIT ENDO TURNOVER/PROCEDURE W/CLEAN A SCOPE LINERS 103888

## (undated) RX ORDER — TRIAMCINOLONE ACETONIDE 40 MG/ML
INJECTION, SUSPENSION INTRA-ARTICULAR; INTRAMUSCULAR
Status: DISPENSED
Start: 2022-10-13

## (undated) RX ORDER — LIDOCAINE HYDROCHLORIDE 10 MG/ML
INJECTION, SOLUTION EPIDURAL; INFILTRATION; INTRACAUDAL; PERINEURAL
Status: DISPENSED
Start: 2023-02-02

## (undated) RX ORDER — TRIAMCINOLONE ACETONIDE 40 MG/ML
INJECTION, SUSPENSION INTRA-ARTICULAR; INTRAMUSCULAR
Status: DISPENSED
Start: 2023-02-02

## (undated) RX ORDER — FENTANYL CITRATE 0.05 MG/ML
INJECTION, SOLUTION INTRAMUSCULAR; INTRAVENOUS
Status: DISPENSED
Start: 2022-07-18

## (undated) RX ORDER — LIDOCAINE HYDROCHLORIDE 10 MG/ML
INJECTION, SOLUTION EPIDURAL; INFILTRATION; INTRACAUDAL; PERINEURAL
Status: DISPENSED
Start: 2022-10-13